# Patient Record
Sex: FEMALE | Race: WHITE | Employment: FULL TIME | URBAN - METROPOLITAN AREA
[De-identification: names, ages, dates, MRNs, and addresses within clinical notes are randomized per-mention and may not be internally consistent; named-entity substitution may affect disease eponyms.]

---

## 2017-01-06 ENCOUNTER — TELEPHONE (OUTPATIENT)
Dept: FAMILY MEDICINE CLINIC | Facility: CLINIC | Age: 22
End: 2017-01-06

## 2017-01-06 NOTE — TELEPHONE ENCOUNTER
Pt informed of Dr Quinn Almanza' response below but re-iterated ER if s/sx worsen or develops SI/HI. Pt voiced understanding and agrees. Denies further questions/concerns at this time.

## 2017-01-06 NOTE — TELEPHONE ENCOUNTER
Dr Gi Alonzo,    Pt Escitalopram dose was increased from 10 mg increased to 20 mg which has helped to some degree, but she is waking up at night in cold sweats with chest pressure, palpitations some sob. dose changed.  She does have stress from work, family an

## 2017-01-10 ENCOUNTER — TELEPHONE (OUTPATIENT)
Dept: FAMILY MEDICINE CLINIC | Facility: CLINIC | Age: 22
End: 2017-01-10

## 2017-01-10 NOTE — PROGRESS NOTES
Patient ID: Tammie Chambers is a 22year old female. HPI  Patient presents with: Anxiety: pt fees anxiety is getting worst   Depression  Chest Pain    She is on Lexapro 20 mg. She still lives in a very crowded house.   One good news though is her fathe and vitals reviewed. Blood pressure 106/71, pulse 85, temperature 97.6 °F (36.4 °C), temperature source Oral, height 5' 6\" (1.676 m), weight 161 lb (73.029 kg), not currently breastfeeding.          ASSESSMENT/PLAN:     Diagnoses and all orders for this

## 2017-01-10 NOTE — TELEPHONE ENCOUNTER
Narda from [facility] would like to get pt on Short term medication option, which is where pt would see Dr Lolita Del Cid for 4-6 sessions (1x monthly) at [facility] and at the end will report back to Dr Savage for continued Medicine management, she

## 2017-01-20 NOTE — TELEPHONE ENCOUNTER
Tried calling again. I was informed by Tonia Pearson that Virgen Sanchez is out of the office and she will be returning on Monday.

## 2017-01-25 ENCOUNTER — LAB ENCOUNTER (OUTPATIENT)
Dept: LAB | Age: 22
End: 2017-01-25
Attending: Other
Payer: COMMERCIAL

## 2017-01-25 DIAGNOSIS — Z00.00 ROUTINE MEDICAL EXAM: ICD-10-CM

## 2017-01-25 LAB
ALBUMIN SERPL BCP-MCNC: 3.7 G/DL (ref 3.5–4.8)
ALBUMIN/GLOB SERPL: 1.2 {RATIO} (ref 1–2)
ALP SERPL-CCNC: 43 U/L (ref 39–325)
ALT SERPL-CCNC: 21 U/L (ref 14–54)
ANION GAP SERPL CALC-SCNC: 7 MMOL/L (ref 0–18)
AST SERPL-CCNC: 19 U/L (ref 15–41)
BILIRUB SERPL-MCNC: 0.8 MG/DL (ref 0.3–1.2)
BUN SERPL-MCNC: 10 MG/DL (ref 8–20)
BUN/CREAT SERPL: 11.4 (ref 10–20)
CALCIUM SERPL-MCNC: 9 MG/DL (ref 8.5–10.5)
CHLORIDE SERPL-SCNC: 103 MMOL/L (ref 95–110)
CO2 SERPL-SCNC: 29 MMOL/L (ref 22–32)
CREAT SERPL-MCNC: 0.88 MG/DL (ref 0.5–1.5)
ERYTHROCYTE [DISTWIDTH] IN BLOOD BY AUTOMATED COUNT: 12.9 % (ref 11–15)
FOLATE SERPL-MCNC: 10.5 NG/ML
GLOBULIN PLAS-MCNC: 3.1 G/DL (ref 2.5–3.7)
GLUCOSE SERPL-MCNC: 94 MG/DL (ref 70–99)
HCG SERPL QL: NEGATIVE
HCT VFR BLD AUTO: 39.2 % (ref 35–48)
HGB BLD-MCNC: 13.1 G/DL (ref 12–16)
MCH RBC QN AUTO: 27.6 PG (ref 27–32)
MCHC RBC AUTO-ENTMCNC: 33.3 G/DL (ref 32–37)
MCV RBC AUTO: 82.8 FL (ref 80–100)
OSMOLALITY UR CALC.SUM OF ELEC: 287 MOSM/KG (ref 275–295)
PLATELET # BLD AUTO: 226 K/UL (ref 140–400)
PMV BLD AUTO: 8.7 FL (ref 7.4–10.3)
POTASSIUM SERPL-SCNC: 3.6 MMOL/L (ref 3.3–5.1)
PROT SERPL-MCNC: 6.8 G/DL (ref 5.9–8.4)
RBC # BLD AUTO: 4.74 M/UL (ref 3.7–5.4)
SODIUM SERPL-SCNC: 139 MMOL/L (ref 136–144)
TSH SERPL-ACNC: 0.62 UIU/ML (ref 0.34–5.6)
VIT B12 SERPL-MCNC: 410 PG/ML (ref 181–914)
WBC # BLD AUTO: 8.1 K/UL (ref 4–11)

## 2017-01-25 PROCEDURE — 36415 COLL VENOUS BLD VENIPUNCTURE: CPT

## 2017-01-25 PROCEDURE — 84443 ASSAY THYROID STIM HORMONE: CPT

## 2017-01-25 PROCEDURE — 82746 ASSAY OF FOLIC ACID SERUM: CPT

## 2017-01-25 PROCEDURE — 82306 VITAMIN D 25 HYDROXY: CPT

## 2017-01-25 PROCEDURE — 84703 CHORIONIC GONADOTROPIN ASSAY: CPT

## 2017-01-25 PROCEDURE — 80053 COMPREHEN METABOLIC PANEL: CPT

## 2017-01-25 PROCEDURE — 82607 VITAMIN B-12: CPT

## 2017-01-25 PROCEDURE — 85027 COMPLETE CBC AUTOMATED: CPT

## 2017-01-27 LAB — 25(OH)D3 SERPL-MCNC: 21.2 NG/ML

## 2017-01-27 RX ORDER — NORGESTIMATE-ETHINYL ESTRADIOL 7DAYSX3 28
TABLET ORAL
Qty: 84 TABLET | Refills: 0 | Status: SHIPPED | OUTPATIENT
Start: 2017-01-27 | End: 2017-04-23

## 2017-02-24 NOTE — ED NOTES
Pt was tachypnic, pt was able to speak in full sentences until now but now pt is breathing normally, verbalized feeling a little better.

## 2017-02-24 NOTE — ED INITIAL ASSESSMENT (HPI)
Father states that pt got yelled at by her boss and afterwards she had a panic attack.   Pt has been here for panic attacks in the past.

## 2017-02-24 NOTE — ED NOTES
Pt verbalized that she got panic attack while at work. Pt has a hx of that. Pt was seen in er about 5x times within 5 year frame. pt verbalized that she started to shake, couldn't breath and became dizzy-she couldn't stand.  Pt was carried by dad and the ma

## 2017-02-24 NOTE — ED PROVIDER NOTES
Patient Seen in: [facility] AND [facility] Emergency Department    History   Patient presents with:   Anxiety/Panic attack (neurologic)    Stated Complaint:     HPI    22year old female with history of anxiety and panic attacks who presents to the ER saying that s (Room air)       Current:/95 mmHg  Pulse 72  Temp(Src) 98.7 °F (37.1 °C) (Oral)  Resp 19  Ht 167.6 cm (5' 6\")  Wt 70.308 kg  BMI 25.03 kg/m2  SpO2 99%  LMP 02/23/2017        Physical Exam   Constitutional: She is oriented to person, place, and time. ---------                               -----------         ------                     CBC W/ DIFFERENTIAL[[id number]]                              Final result                 Please view results for these tests on the individual orders. interpretation of the above emergency department workup, the patient was found to have Panic attack  (primary encounter diagnosis)    Plan: Supportive care. Further Outpatient evaluation and treatment will be required.  I personally discussed the results o

## 2017-02-27 NOTE — TELEPHONE ENCOUNTER
Reason for Call/Chief Complaint: panic attack, chest pain  Onset: intermittent  Nursing Assessment/Associated Symptoms: Pt states she was in ER for a panic attack on 2/23/17.  She is on zoloft and clonazepam. She does have a psychiatrist but she does not pa

## 2017-02-27 NOTE — TELEPHONE ENCOUNTER
Pt calling regarding going to the hospital for really bad panic attack for two hours on Friday and now she is experiencing chest pain on an off. ... please advise

## 2017-02-27 NOTE — TELEPHONE ENCOUNTER
I went ahead and did a referral through [facility] for her mental health to get her a counselor for

## 2017-02-28 ENCOUNTER — TELEPHONE (OUTPATIENT)
Dept: FAMILY MEDICINE CLINIC | Facility: CLINIC | Age: 22
End: 2017-02-28

## 2017-02-28 NOTE — PROGRESS NOTES
Patient ID: Joss Saldana is a 22year old female.     HPI  Patient presents with:  ER F/U: shortness of breath  and anxiety       ED Provider Notes by Deepika Stout MD at 2/23/2017  8:34 PM      Author: Deepika Stout MD Service: (none) Author Typ she feels confused because of her anxiety. We are trying to get her a counselor as well to talk to.           Review of Systems         Current Outpatient Prescriptions:  ClonazePAM (KLONOPIN) 0.5 MG Oral Tab Take 1 tablet (0.5 mg total) by mouth 2 (two) t does not realize that her most likely taking deep breaths and having heart racing is due to anxiety and panic attacks.   I told her most times this is just a chemical imbalance in the brain and she would benefit from counseling as well as seeing a psychiatr

## 2017-02-28 NOTE — TELEPHONE ENCOUNTER
Per pt she was in ER last 02/23/2017 for panic attack and ER  did not give any med to pt but pt is taking Zoloft 100mg and clonopin but lately pt is gasping for air and it hurts her chest for a couple of mins .  Pt stts that she have not see VS for F/U af

## 2017-02-28 NOTE — TELEPHONE ENCOUNTER
Reason for Call/Chief Complaint:  Every 5 minutes gasp for air, feels dizzy & weak since Thursday severe panic attack. Chest gets tight. Onset: Hx of anxiety & panic attack. Last panic attack on 2/23/17-ED visit.   Nursing Assessment/Associated Sympt

## 2017-03-04 ENCOUNTER — TELEPHONE (OUTPATIENT)
Dept: FAMILY MEDICINE CLINIC | Facility: CLINIC | Age: 22
End: 2017-03-04

## 2017-03-04 NOTE — TELEPHONE ENCOUNTER
Patient calling requesting to speak with DR. Gardner's nurse   Patient feeling anxiety, she is on medication and just took it 2 hours ago but still feels anxiety and wants to ask nurses if she can take another pill  transferring to nurse now

## 2017-03-04 NOTE — TELEPHONE ENCOUNTER
Please reassess pt.  Can take 2nd one but also concerned pt may need adjustments on her treatment and should follow up with Dr. Juan M Brown this week

## 2017-03-04 NOTE — TELEPHONE ENCOUNTER
Reason for Call/Chief Complaint: Anxious and took lorazepam 0.5 mg 2 hours ago and wondering if can take 2nd dose now as normally waits 12 hrs in between doses  Onset: today  Nursing Assessment/Associated Symptoms: States \"something happened\" (did not wa

## 2017-03-04 NOTE — TELEPHONE ENCOUNTER
Spoke with pt who reports did take the second dose and feels better. Pt in pleasant mood on the phone.  Informed of Dr Quin Kong recommendation that she follow up with VS and pt agrees; scheduled for Monday 3/6/17 at 11:20am. Patient denied further questions/co

## 2017-03-06 NOTE — PROGRESS NOTES
Patient ID: Rashmi Vaughn is a 22year old female. HPI  Patient presents with: Anxiety    She states 2 days ago she was feeling some chest pain and she thought it was her anxiety so she did take an Ativan and it helped almost immediately.   A few hour heard.  Pulmonary/Chest: Effort normal and breath sounds normal. No respiratory distress. Neurological: Patient is alert and oriented to person, place, and time. Skin: Skin is warm and dry. Psychiatric: Patient is anxious.   Nursing note and vitals re

## 2017-03-08 ENCOUNTER — TELEPHONE (OUTPATIENT)
Dept: FAMILY MEDICINE CLINIC | Facility: CLINIC | Age: 22
End: 2017-03-08

## 2017-03-08 ENCOUNTER — APPOINTMENT (OUTPATIENT)
Dept: GENERAL RADIOLOGY | Facility: HOSPITAL | Age: 22
End: 2017-03-08
Payer: COMMERCIAL

## 2017-03-08 PROCEDURE — 71020 XR CHEST PA + LAT CHEST (CPT=71020): CPT

## 2017-03-08 NOTE — TELEPHONE ENCOUNTER
Pt is calling is calling state that she is having heart palpitation pt is requesting to speak with a RN

## 2017-03-08 NOTE — ED PROVIDER NOTES
Patient Seen in: [facility] AND [facility] Emergency Department    History   Patient presents with:   Anxiety/Panic attack (neurologic)  Chest Pain Angina (cardiovascular)    Stated Complaint:     HPI    Patient a 21-year-old female presents to the ER with past m Negative. Respiratory: Positive for chest tightness. Negative for cough and shortness of breath. Cardiovascular: Positive for chest pain and palpitations. Negative for leg swelling. Gastrointestinal: Negative.   Negative for nausea, vomiting, abdomi Skin: Skin is warm and dry. Psychiatric: Her speech is normal and behavior is normal. Judgment and thought content normal. Her mood appears anxious. Cognition and memory are normal.   Anxious, but cooperative.  Denies SI/HI     Differential diagnosis in Unremarkable pulmonary vasculature. MEDIAST/MADY: No visible mass or adenopathy. LUNGS/PLEURA: Normal.  No significant pulmonary parenchymal abnormalities. No effusion or pleural thickening. BONES: No fracture or visible bony lesion. OTHER: Negative.

## 2017-03-08 NOTE — ED NOTES
Care assumed. Assessment completed. Alert and interactive with 2 week hx of intermittent CP lasting 30 sec occurring both with activity and at rest. Denies \"racing heart\"/SOB/N/diaphoresis.

## 2017-03-08 NOTE — TELEPHONE ENCOUNTER
Reason for Call/Chief Complaint: Patient states has been having increasing crushing pain in chest for the last week   Onset: one week   Nursing Assessment/Associated Symptoms: Patient states that for the last two weeks she has been having a crushing stabbing pain in the middle of her chest. She states that this has become more frequent and last night was awoken with the pain. She states that she does get SOB when that occurs. States today she had to pull over while driving as she experienced the crushing squeezing pain in her chest, states did feel SOB when that occurred. Patient does have anxiety but only takes Lorazepam as needed. ____________________________________________________  Medication List reviewed: yes - patient states has been on [facility] since 13years of age. Has been on Zoloft for a few months as well. Allergies verified: yes  _____________________________________________________  Nursing Interventions/Advice: Advised evaluation at ER due to chest pain / SOB   Able to verbalize and acknowledge recommended instructions in own words? yes  Sent to Physician for verbal/written orders and treatment as necessary: no  _____________________________________________________  Recommended disposition: Sent to [facility] ER - declined 911 to be called by patient, will have someone bring her to ER  Patient was offered an appointment: no  If yes, when is the appointment: Will follow up 3/9/17  Patient provided with clinic contact information, hours of operation and will call back if needed. Patient verbalizes understanding and agrees with plan.

## 2017-03-09 NOTE — TELEPHONE ENCOUNTER
LMTCB. Please transfer to [id number] anytime. Pt seen at [address] [place] [address] ER for anxiety related chest pain.

## 2017-03-09 NOTE — ED NOTES
Pt left department after review of diagnostics with Fredis Castle without receiving discharge instructions or final vital signs.

## 2017-03-09 NOTE — TELEPHONE ENCOUNTER
Pt verifies seen in [address] [place] [address] ER and diagnosed with anxiety related chest pain after normal cadiac work up. States did not feel like was having increased anxiety then or now but still having slight mid-chest discomfort. Advised f/u with Dr Charo Richards and pt agrees. Scheduled for tomorrow at 1:20pm. Advised to call back if s/sx increase or develops new sympotoms; ER if severe chest pain or if develops SOB. Pt voiced understanding and agrees.

## 2017-03-10 ENCOUNTER — OFFICE VISIT (OUTPATIENT)
Dept: FAMILY MEDICINE CLINIC | Facility: CLINIC | Age: 22
End: 2017-03-10

## 2017-03-10 VITALS
HEIGHT: 66 IN | BODY MASS INDEX: 26.2 KG/M2 | SYSTOLIC BLOOD PRESSURE: 141 MMHG | WEIGHT: 163 LBS | HEART RATE: 81 BPM | TEMPERATURE: 98 F | DIASTOLIC BLOOD PRESSURE: 68 MMHG

## 2017-03-10 DIAGNOSIS — F41.0 PANIC ATTACKS: ICD-10-CM

## 2017-03-10 DIAGNOSIS — R07.89 ATYPICAL CHEST PAIN: Primary | ICD-10-CM

## 2017-03-10 PROCEDURE — 99212 OFFICE O/P EST SF 10 MIN: CPT | Performed by: FAMILY MEDICINE

## 2017-03-10 PROCEDURE — 99214 OFFICE O/P EST MOD 30 MIN: CPT | Performed by: FAMILY MEDICINE

## 2017-03-10 NOTE — PROGRESS NOTES
Patient ID: Amee Nolen is a 22year old female.       ED Provider Notes by ADAL Scott at 3/8/2017  5:40 PM      Author: ADAL Scott Service: (none) Author Type: ADAL     Filed: 3/8/2017 11:10 PM Note Time: 3/8/2017  5:40 PM Status: Attested when she is near animals she feels much more calm and has no difficulties or anxiety or chest pain or heart pain whatsoever but once she leaves there is no she is to get back to her regular life this is when the pain comes back and the anxiety comes back. she really starts having chest pain or panic attack before taking the medicine. Try to do biofeedback and breathing techniques.   Go to the far more often and of course see the psychiatrist.  Panic attacks  Hospital notes reviewed      Follow up if symptom

## 2017-03-16 NOTE — TELEPHONE ENCOUNTER
Pt stts yesterday she only took a pill and a half for her anxiety   Pt stts yesterday she was feeling some kind of anxiety   Pt stts when she takes 2 pills she doesn't feel anxiety   Pt is asking to have a call back to further discuss

## 2017-03-16 NOTE — TELEPHONE ENCOUNTER
Actions Requested: Asking if it is okay to take 2 of her Zoloft pills instead of the 1.5, she states she took 2 the other day and did not have anxiety the whole day, states when taking the 1.5 she has anxiety even with the Lorazepam in between  Situation/B

## 2017-03-17 NOTE — TELEPHONE ENCOUNTER
Spoke to pt, informed her of medication change. Instructed her to take only 200mg as that is the highest dose of Zoloft that she can take. Advised her to call back if she has any questions or concerns. She voices understanding and agrees with plan.

## 2017-03-17 NOTE — TELEPHONE ENCOUNTER
I went ahead and sent a new prescription to the pharmacy stating that she can be on a total of 200 mg of Zoloft daily. This is the maximum dose so she should not go over this.

## 2017-04-03 ENCOUNTER — TELEPHONE (OUTPATIENT)
Dept: FAMILY MEDICINE CLINIC | Facility: CLINIC | Age: 22
End: 2017-04-03

## 2017-04-03 NOTE — TELEPHONE ENCOUNTER
She fell off her horse two days ago and her neck hurts, she didn't go to the ER or IC, she wants to speak with a nurse due to her anxiety making everything worse in her mind, she's not sure about medication.  Please advise

## 2017-04-03 NOTE — TELEPHONE ENCOUNTER
Actions Requested: States two days ago she fell off her horse, she states she doesn't recall hitting her head, states yesterday and today her neck is very stiff and needs to use her hands just to move her neck. She states also her left leg hurts.  Advised t

## 2017-04-04 ENCOUNTER — HOSPITAL ENCOUNTER (EMERGENCY)
Facility: HOSPITAL | Age: 22
Discharge: HOME | End: 2017-04-04
Attending: EMERGENCY MEDICINE
Payer: COMMERCIAL

## 2017-04-04 VITALS
TEMPERATURE: 98 F | HEIGHT: 66 IN | RESPIRATION RATE: 18 BRPM | DIASTOLIC BLOOD PRESSURE: 75 MMHG | OXYGEN SATURATION: 98 % | HEART RATE: 76 BPM | WEIGHT: 160 LBS | BODY MASS INDEX: 25.71 KG/M2 | SYSTOLIC BLOOD PRESSURE: 134 MMHG

## 2017-04-04 DIAGNOSIS — S16.1XXA NECK STRAIN, INITIAL ENCOUNTER: Primary | ICD-10-CM

## 2017-04-04 PROCEDURE — 99283 EMERGENCY DEPT VISIT LOW MDM: CPT

## 2017-04-04 RX ORDER — IBUPROFEN 600 MG/1
600 TABLET ORAL ONCE
Status: COMPLETED | OUTPATIENT
Start: 2017-04-04 | End: 2017-04-04

## 2017-04-04 RX ORDER — IBUPROFEN 600 MG/1
600 TABLET ORAL EVERY 8 HOURS PRN
Qty: 30 TABLET | Refills: 0 | Status: SHIPPED | OUTPATIENT
Start: 2017-04-04 | End: 2017-05-03 | Stop reason: ALTCHOICE

## 2017-04-04 NOTE — TELEPHONE ENCOUNTER
Patient contacted, advised that she needs to be transported to ER, 911 contacted and stayed on the line until EMS arrived, call discontinued

## 2017-04-04 NOTE — ED INITIAL ASSESSMENT (HPI)
Pt had a fall from a horse two days ago. Pt reports unresolved neck and mid back. Pt denies  LOC.  No issues with b/b

## 2017-04-05 NOTE — TELEPHONE ENCOUNTER
Pt seen in [address] [place] [address] ER 4/4/17 and states did not have diagnostict testing done--states doctor just felt neck and was diagnosed with neck strain (dx noted in [address] [place] [address] documentation); pt states no neck brace provided.  Noted pt prescribed ibuprofen but pt reports only

## 2017-04-05 NOTE — ED PROVIDER NOTES
Patient Seen in: [facility] Emergency Department    History   Patient presents with:  Head Neck Injury (neurologic, musculoskeletal)      HPI    The patient presents complaining of anterior neck pain after falling off of a horse 2 days ago.   She s Skin: Negative for rash and wound. Neurological: Negative for syncope and headaches. Constitutional and vital signs reviewed. All other systems reviewed and negative except as noted above.     PSFH elements reviewed from today and agreed exce follow-up.       Disposition and Plan     Clinical Impression:  Neck strain, initial encounter  (primary encounter diagnosis)    Disposition:  Discharge    Follow-up:  Isadora Garcia DO  [address]  [phone]    Sched

## 2017-04-06 NOTE — TELEPHONE ENCOUNTER
Offered pt appt but declined at this time. States will call back for f/u with [facility] once figures out a day/time she can come in.    Dr Pineda Tickfaw, please note or advise further

## 2017-04-11 ENCOUNTER — TELEPHONE (OUTPATIENT)
Dept: FAMILY MEDICINE CLINIC | Facility: CLINIC | Age: 22
End: 2017-04-11

## 2017-04-11 NOTE — TELEPHONE ENCOUNTER
Per pt, she saw in her Mychart that she is due to christian, Hep A; HPV; Meningitis and influenza. Pt would like to know is she needs this vaccines?   If so needs to know when can she come in?

## 2017-04-21 ENCOUNTER — TELEPHONE (OUTPATIENT)
Dept: FAMILY MEDICINE CLINIC | Facility: CLINIC | Age: 22
End: 2017-04-21

## 2017-04-21 NOTE — TELEPHONE ENCOUNTER
Actions Requested: Pt requesting new referral for different psychiatrist.   Situation/Background   Problem: Anxiety   Onset: q5cydual   Associated Symptoms: States wanting to have a different psychiatrist. States has been given medications and is being t

## 2017-04-21 NOTE — TELEPHONE ENCOUNTER
Pt returned call. Pt informed of Dr. Gorman Scale message as shown below. Pt aware [facility] will get a hold of her. Pt verbalized understanding of whole message and had no further questions or requests at this time.

## 2017-04-21 NOTE — TELEPHONE ENCOUNTER
Patient called to request for a new referral for a different psychiatrist. She is seeing Dr. Kirsty Friedman in [place] and states that she is not much help - is only giving her medication and is basically telling her to figure out things on her own, states sh

## 2017-04-21 NOTE — TELEPHONE ENCOUNTER
Let her know a new referral to [facility] has been done. They will get a hold of her. I sent her message to them as well so they can see what the issue was.

## 2017-04-24 RX ORDER — NORGESTIMATE-ETHINYL ESTRADIOL 7DAYSX3 28
TABLET ORAL
Qty: 1 PACKAGE | Refills: 0 | Status: SHIPPED | OUTPATIENT
Start: 2017-04-24 | End: 2017-05-24

## 2017-04-24 NOTE — TELEPHONE ENCOUNTER
Gynecology Medications; PLEASE ADVISE ON REFILL. THANKS.   Protocol Criteria:  · Appointment scheduled in the past 12 months or the next 3 months  · Pap smear in the past 12 months  · Pap smear WNL manually verified  Recent Visits       Provider Department

## 2017-04-26 NOTE — TELEPHONE ENCOUNTER
I placed a referral for [facility]. Please let pt know someone will be contacting her for this and please call them in morning so they call her promptly to decrease repeat ER visits.

## 2017-04-26 NOTE — TELEPHONE ENCOUNTER
Actions Requested: Pt called, states that her anxiety has increased.   Situation/Background   Problem: Anxiety   Onset: last couple of days   Associated Symptoms: Dizziness, shaking, difficulty remembering things   History of Same:    Precipitated By: Cj Coulter' > 10 minutes and not relieved by reassurance provided by triager    Negative Triage Questions:   * Severe difficulty breathing (e.g., struggling for each breath, speaks in single words)  * Bluish lips, tongue, or face now  * Difficult to awaken or acting co

## 2017-04-26 NOTE — TELEPHONE ENCOUNTER
Patient gets the shakes and her head feels heavy, nosebleed, light headed/ and anxiety attacks  Please advise

## 2017-04-26 NOTE — TELEPHONE ENCOUNTER
Pt informed of Dr Walker Stafford orders below which she verbalized understanding. Pts' father will be taking her to ED today. Clinical staff to call [facility] 4/27/17 and make sure someone will call her promptly.

## 2017-04-26 NOTE — TELEPHONE ENCOUNTER
Called pt to see if symptoms have resolved, pt states that she still feels the same, took her medication again with no relief but pt is reluctant to go to the ED.    When asking about stress reduction, I asked if it helped for her to take a walk with her rubi

## 2017-04-27 NOTE — TELEPHONE ENCOUNTER
Received a tab from [employer] to notify us that this patient has been contacted by them and evaluation for anxiety / depressive disorder  has been initiated. They will make Dr. August Nina aware as well.

## 2017-04-27 NOTE — ED PROVIDER NOTES
Patient Seen in: [facility] AND [facility] Emergency Department    History   Patient presents with:   Anxiety/Panic attack (neurologic)    Stated Complaint: anxiety    HPI    32-year-old female with history of anxiety and panic attacks presents with complaints of complaint: anxiety  Other systems are as noted in HPI. Constitutional and vital signs reviewed. All other systems reviewed and negative except as noted above. PSFH elements reviewed from today and agreed except as otherwise stated in HPI.     Physi All other components within normal limits   CBC WITH DIFFERENTIAL WITH PLATELET    Narrative: The following orders were created for panel order CBC WITH DIFFERENTIAL WITH PLATELET.   Procedure                               Abnormality         Status

## 2017-04-28 ENCOUNTER — TELEPHONE (OUTPATIENT)
Dept: FAMILY MEDICINE CLINIC | Facility: CLINIC | Age: 22
End: 2017-04-28

## 2017-05-01 NOTE — TELEPHONE ENCOUNTER
Spoke with patient (name and  verified),patient would like to set up f/u visit for ER visit, panic attack 4, she was informed that Dr Jacquie Friedman was out of the office until 17, she requested to see another physician, appt made 5/3/17 with Dr Naveen Salgado

## 2017-05-03 NOTE — PROGRESS NOTES
5/3/2017  11:13 AM    Hussein Chi is a 22year old female. Chief complaint(s): Patient presents with:  ER F/U: Patient here to f/u after being in the ER for her anxiety. Patient feels like her depression/anxiety is getting worse.  She is becoming very 04/27/2012      TDAP                  04/27/2012      Varicella             08/19/1998 11/23/2012      Medications (Active prior to today's visit):    Current Outpatient Prescriptions:  TRINESSA, 28, 0.18/0.215/0.25 MG-35 MCG Oral Tab TAKE 1 TABLET normal and breath sounds normal. She has no wheezes. She has no rales. Lymphadenopathy:     She has no cervical adenopathy. Skin: No rash noted. Psychiatric: She has a normal mood and affect.  Her behavior is normal.       LABORATORY RESULTS:   No res 2.8 1.0-4.0 K/UL   Monocyte Absolute 0.8 0.0-1.0 K/UL   Eosinophil Absolute 0.5 0.0-0.7 K/UL   Basophil Absolute 0.1 0.0-0.2 K/UL     EKG / Spirometry : -     Radiology: No results found. -    ASSESSMENT/PLAN:   Assessment  Recurrent major depressive disor

## 2017-05-16 ENCOUNTER — TELEPHONE (OUTPATIENT)
Dept: FAMILY MEDICINE CLINIC | Facility: CLINIC | Age: 22
End: 2017-05-16

## 2017-05-16 RX ORDER — SERTRALINE HYDROCHLORIDE 100 MG/1
200 TABLET, FILM COATED ORAL DAILY
Qty: 60 TABLET | Refills: 2 | Status: SHIPPED | OUTPATIENT
Start: 2017-05-16 | End: 2017-05-17

## 2017-05-16 NOTE — TELEPHONE ENCOUNTER
LOV for anxiety/depression- 5/3/17 with Dr. Eric Parrish  LR-3/2017 3 month supply. Does not meet RN protocol, pls advise on refill request. Thank you.

## 2017-05-16 NOTE — TELEPHONE ENCOUNTER
From: Ibis Bronson  To:  Ailin Domínguez DO  Sent: 5/4/2017 9:49 PM CDT  Subject: Medication Renewal Request    Original authorizing provider: DO Ibis Nguyen would like a refill of the following medications:  Sertraline HCl 100 MG Ora

## 2017-05-16 NOTE — TELEPHONE ENCOUNTER
Dr. Richard Kahn and Dr. Nohemi Adkins,               I have left several messages for your patient with my contact information in an effort to help with behavioral health navigation but I have not received a response.  She was not happy with her treating psychiatrist,

## 2017-05-24 ENCOUNTER — TELEPHONE (OUTPATIENT)
Dept: FAMILY MEDICINE CLINIC | Facility: CLINIC | Age: 22
End: 2017-05-24

## 2017-05-24 NOTE — TELEPHONE ENCOUNTER
Pt was given medication from her physiatrist . Pt now stating that she keep getting shortness of breath. Seems like her heart is beating fast. Pt feeling very uncomfortable.

## 2017-05-24 NOTE — TELEPHONE ENCOUNTER
Actions Requested: Pt reports SOB, \"feel like I ran around the block, but I am just standing here. \"   Started earlier today, has some dizziness and feels heart racing.   Pt has had panic attacks in the past and started abilify, gabapentin, Fluoxetine on 5 and hasn't returned  * Choking on something  * Severe difficulty breathing (e.g., struggling for each breath, speaks in single words, pulse > 120)  * Bluish lips, tongue, or face now  * Difficult to awaken or acting confused (e.g., disoriented, slurred spe

## 2017-05-25 NOTE — TELEPHONE ENCOUNTER
LMTCB- transfer patient to triage [id number]. ER f/u.  Do not see that patient went to [address] [place] [address] ER.

## 2017-05-26 NOTE — PATIENT INSTRUCTIONS
Go ahead and start taking 3 of the fluoxetine 10 mg at the same time to equal 30 mg per day. Continue taking her gabapentin 300 mg 3 times daily and then the Abilify 5 mg once daily.   Make sure to see her psychiatrist.

## 2017-05-26 NOTE — TELEPHONE ENCOUNTER
Patient did not go to the ED. She stated she feels fine today. Patient stated her Dad feels she is getting asthma. She stated she continues to have a racing heart but always does due to her anxiety.   Appointment made for today at 2:10 pm for evaluation

## 2017-05-26 NOTE — PROGRESS NOTES
Patient ID: Aicha Tyson is a 22year old female.       Telephone Encounter by Chey Espinoza RN at 5/24/2017  4:52 PM      Author: Chey Espinoza RN Service: (none) Author Type: Registered Nurse     Filed: 5/24/2017  5:20 PM Note Time: 5/24/2017   able to restate instructions in their own words, verbalizes understanding and agrees with plan.     Protocol Used: Breathing Difficulty (Adult)  Protocol-Based Disposition: Go to ED Now          HPI  Patient presents with:  Shortness Of Breath: about a week TRINESSA, 28, 0.18/0.215/0.25 MG-35 MCG Oral Tab TAKE 1 TABLET BY MOUTH EVERY DAY Disp: 1 Package Rfl: 0     Allergies:  Iodine (Topical)        Rash  Radiology Contrast *       PHYSICAL EXAM:   Physical Exam  Blood pressure 134/96, pulse 83, temperature Take medicine (if given) as prescribed. Approach to treatment discussed and patient/family member understands and agrees to plan.        Dc Camacho,   5/26/2017

## 2017-05-30 RX ORDER — NORGESTIMATE-ETHINYL ESTRADIOL 7DAYSX3 28
TABLET ORAL
Qty: 28 TABLET | Refills: 2 | Status: SHIPPED | OUTPATIENT
Start: 2017-05-30 | End: 2017-07-08

## 2017-05-30 NOTE — TELEPHONE ENCOUNTER
Refill Protocol Appointment Criteria  · Appointment scheduled in the past 12 months or in the next 3 months  Recent Visits       Provider Department Primary Dx    3 weeks ago Krzysztof Oliveros MD [facility] [place] Recurrent major depre

## 2017-06-03 ENCOUNTER — TELEPHONE (OUTPATIENT)
Dept: FAMILY MEDICINE CLINIC | Facility: CLINIC | Age: 22
End: 2017-06-03

## 2017-06-03 NOTE — TELEPHONE ENCOUNTER
If the pain gets very bad she will have to go to the immediate care or emergency room for evaluation.

## 2017-06-03 NOTE — TELEPHONE ENCOUNTER
Spoke with pt informed per Dr MEREDITH to go to immediate care/ER if pain persists or worsens.  Pt states pain is still present she will go to [facility] immediate care today,

## 2017-06-03 NOTE — TELEPHONE ENCOUNTER
Pt states she has been taking Birthcontrol pills since she was 16. Pt states she is now having very bad left side pain, states it's a sharp pain. Pt states it hurst when moving around or eating. States she cant finish eating due to no appetite.    Pt stat

## 2017-06-03 NOTE — TELEPHONE ENCOUNTER
Actions Requested: Pt requesting advise from nurse. Situation/Background   Problem: Intermittent left side abdominal pain near ovary area that is sharp, duration  one week, occurs about 20 times per day with PS 8. Sometimes feels bloated and gassy.  Normal minutes and over 48years old  * Pain lasting > 10 minutes and over 36years old and associated chest, arm, neck, upper back, or jaw pain  * Pain lasting > 10 minutes and over 28years old and at least one cardiac risk factor  * Pain lasting > 10 minutes a

## 2017-06-13 ENCOUNTER — HOSPITAL ENCOUNTER (EMERGENCY)
Facility: HOSPITAL | Age: 22
Discharge: HOME | End: 2017-06-13
Attending: EMERGENCY MEDICINE
Payer: COMMERCIAL

## 2017-06-13 VITALS
SYSTOLIC BLOOD PRESSURE: 134 MMHG | HEIGHT: 66 IN | HEART RATE: 69 BPM | TEMPERATURE: 98 F | OXYGEN SATURATION: 97 % | RESPIRATION RATE: 18 BRPM | DIASTOLIC BLOOD PRESSURE: 78 MMHG | BODY MASS INDEX: 27.32 KG/M2 | WEIGHT: 170 LBS

## 2017-06-13 DIAGNOSIS — R07.89 CHEST TIGHTNESS: Primary | ICD-10-CM

## 2017-06-13 PROCEDURE — 85610 PROTHROMBIN TIME: CPT | Performed by: EMERGENCY MEDICINE

## 2017-06-13 PROCEDURE — 93005 ELECTROCARDIOGRAM TRACING: CPT

## 2017-06-13 PROCEDURE — 36415 COLL VENOUS BLD VENIPUNCTURE: CPT

## 2017-06-13 PROCEDURE — 85379 FIBRIN DEGRADATION QUANT: CPT | Performed by: EMERGENCY MEDICINE

## 2017-06-13 PROCEDURE — 85730 THROMBOPLASTIN TIME PARTIAL: CPT | Performed by: EMERGENCY MEDICINE

## 2017-06-13 PROCEDURE — 99283 EMERGENCY DEPT VISIT LOW MDM: CPT

## 2017-06-13 PROCEDURE — 80048 BASIC METABOLIC PNL TOTAL CA: CPT | Performed by: EMERGENCY MEDICINE

## 2017-06-13 PROCEDURE — 93010 ELECTROCARDIOGRAM REPORT: CPT | Performed by: EMERGENCY MEDICINE

## 2017-06-14 NOTE — ED NOTES
Patient is resting comfortably, watching TV. She is calm, cooperative and conversant, interacting normally with family.

## 2017-06-14 NOTE — ED INITIAL ASSESSMENT (HPI)
Pt states hx panic attack and states \"my dad doesn't understand that when I say my heart hurts that it is a panic attack so he made me come. \" Denies cp at this time but states left-sided cp while driving here. Pt in no distress at this time.

## 2017-06-14 NOTE — ED PROVIDER NOTES
Patient Seen in: [facility] AND [facility] Emergency Department    History   Patient presents with: Anxiety/Panic attack (neurologic)    Stated Complaint: anxiety; blood in stool    HPI    25year old female complains of \"anxiety\".   Patient reports that on a ch today and agreed except as otherwise stated in HPI.     Physical Exam       ED Triage Vitals   BP 06/13/17 2116 152/88 mmHg   Pulse 06/13/17 2116 73   Resp 06/13/17 2116 18   Temp 06/13/17 2116 98.2 °F (36.8 °C)   Temp src 06/13/17 2116 Oral   SpO2 06/13/17 (8) - Abnormal; Notable for the following:     Glucose 124 (*)     All other components within normal limits   D-DIMER - Normal   PTT, ACTIVATED - Normal   PROTHROMBIN TIME (PT) - Normal      EKG    Rate, intervals and axes as noted on EKG Report.   Rate: 7 or Sign-Outs: None    Impression:  After review and interpretation of the above emergency department workup, the patient was found to have Chest tightness  (primary encounter diagnosis)    Plan: Supportive care.   Further Outpatient evaluation and treatment

## 2017-06-27 ENCOUNTER — TELEPHONE (OUTPATIENT)
Dept: FAMILY MEDICINE CLINIC | Facility: CLINIC | Age: 22
End: 2017-06-27

## 2017-06-27 NOTE — TELEPHONE ENCOUNTER
Actions Requested: appt made today at 9:50am with DR MEREDITH. If patient feels symptoms worsening, will go to ER.    Situation/Background   Problem: chest pain   Onset: feels episodes daily    Associated Symptoms: feels nauseated with pain;  Dizzy, feels hot; ch

## 2017-07-08 ENCOUNTER — OFFICE VISIT (OUTPATIENT)
Dept: FAMILY MEDICINE CLINIC | Facility: CLINIC | Age: 22
End: 2017-07-08

## 2017-07-08 ENCOUNTER — TELEPHONE (OUTPATIENT)
Dept: INTERNAL MEDICINE CLINIC | Facility: CLINIC | Age: 22
End: 2017-07-08

## 2017-07-08 VITALS
RESPIRATION RATE: 16 BRPM | HEIGHT: 66 IN | DIASTOLIC BLOOD PRESSURE: 74 MMHG | SYSTOLIC BLOOD PRESSURE: 110 MMHG | BODY MASS INDEX: 29.89 KG/M2 | HEART RATE: 68 BPM | TEMPERATURE: 99 F | WEIGHT: 186 LBS

## 2017-07-08 DIAGNOSIS — F41.9 ANXIETY: ICD-10-CM

## 2017-07-08 DIAGNOSIS — R51.9 FRONTAL HEADACHE: ICD-10-CM

## 2017-07-08 DIAGNOSIS — L30.9 DERMATITIS: Primary | ICD-10-CM

## 2017-07-08 PROCEDURE — 99214 OFFICE O/P EST MOD 30 MIN: CPT | Performed by: FAMILY MEDICINE

## 2017-07-08 PROCEDURE — 99212 OFFICE O/P EST SF 10 MIN: CPT | Performed by: FAMILY MEDICINE

## 2017-07-08 RX ORDER — HYDROXYZINE HYDROCHLORIDE 10 MG/1
10 TABLET, FILM COATED ORAL NIGHTLY PRN
Qty: 10 TABLET | Refills: 0 | Status: SHIPPED | OUTPATIENT
Start: 2017-07-08 | End: 2017-07-18

## 2017-07-08 RX ORDER — DEXAMETHASONE 4 MG/1
4 TABLET ORAL
Qty: 4 TABLET | Refills: 0 | Status: SHIPPED | OUTPATIENT
Start: 2017-07-08 | End: 2017-07-12

## 2017-07-08 NOTE — PROGRESS NOTES
Patient ID: Tammie Chambers is a 25year old female. HPI  Patient presents with:  Headache  Rash: stomach and back   Anxiety   she is seeing psychiatry for anxiety. She states 2 days ago she started getting a rash on her thighs, left breast, back.   D not currently breastfeeding. Physical Exam   Constitutional: Patient is oriented to person, place, and time. Patient appears well-developed and well-nourished. Physical Exam   Constitutional: Patient is oriented to person, place, and time.  Patient topically 2 (two) times daily as needed. Let start Decadron for 4 days. Hydroxyzine at bedtime. Triamcinolone as needed. Frontal headache  -     dexamethasone (DECADRON) 4 MG tablet;  Take 1 tablet (4 mg total) by mouth daily with breakfast.  -     Hydr

## 2017-07-08 NOTE — TELEPHONE ENCOUNTER
Actions Requested: recommendation  Problem: Bug bites ,itchy rash on back , chest and stomach ,severe headache,chest pain and dizziness. . No fever.     Onset and Timin week  Associated Symptoms: Bug bites,itchy rash on back , chest and stomach and sever call back if needed. Patient was able to restate instructions in their own words, verbalizes understanding and agrees with plan.

## 2017-07-08 NOTE — TELEPHONE ENCOUNTER
If she has not been seen by the emergency room, have her see me instead. She has been worked up in the emergency room numerous times for anxiety related chest pain.

## 2017-08-09 NOTE — TELEPHONE ENCOUNTER
Pt asking to speak with a nurse or Dr. Brian Calderon to discuss seeing psychiatry long term. Please advise.

## 2017-08-10 NOTE — TELEPHONE ENCOUNTER
Please advise. Thank you. Pt requesting referral to another Psychiatrist. Pt states that she did not have a good experience with Dr. Lennox Vega.     General psychiatry referral pended for MD approval.    She also states that the doctor suggested that the pt

## 2017-08-15 NOTE — TELEPHONE ENCOUNTER
Pt advised to contact [facility] for self referral to find new provider. Referral for psychiatrist was cancelled by [facility] care due to pt being able to self refer.

## 2017-08-17 ENCOUNTER — NURSE TRIAGE (OUTPATIENT)
Dept: OTHER | Age: 22
End: 2017-08-17

## 2017-08-17 NOTE — TELEPHONE ENCOUNTER
Action Requested: Summary for Provider     []  Critical Lab, Recommendations Needed  [] Need Additional Advice  [x]   FYI    []   Need Orders  [] Need Medications Sent to Pharmacy  []  Other     SUMMARY: Pt contacts clinic with symptoms as below occurring Health Maintenance Summary     Topic Due On Due Status Completed On Postpone Until Reason    MAMMOGRAM - BREAST CANCER SCREENING May 21, 2011 Overdue May 21, 2009      Colorectal Cancer Screening - Colonoscopy May 21, 2019 Not Due May 21, 2009      Osteoporosis Screening  Completed Jul 29, 2016      Immunization-Zoster Oct 2, 2006 Overdue       Immunization - Pneumococcal Jul 11, 2017 Overdue Jul 11, 2016      Immunization - TDAP Pregnancy  Hidden       Medicare Wellness Visit Oct 2, 2011 Overdue       IMMUNIZATION - DTaP/Tdap/Td Jun 7, 2016 Postponed Jun 7, 2006 Aug 10, 2018 Insurance or Financial    Immunization-Influenza Sep 1, 2017 Not Due Dec 17, 2009            Patient is due for topics as listed above, she wishes to discuss with provider .  Pt refuses all vaccines today but stating will schedule her Medicare Wellness with Dr. Winters.

## 2017-09-12 RX ORDER — NORGESTIMATE-ETHINYL ESTRADIOL 7DAYSX3 28
TABLET ORAL
Qty: 28 TABLET | Refills: 0 | Status: SHIPPED | OUTPATIENT
Start: 2017-09-12 | End: 2017-10-09

## 2017-09-12 NOTE — TELEPHONE ENCOUNTER
Gynecology Medications  Protocol Criteria:  · Appointment scheduled in the past 12 months or the next 3 months  · Pap smear in the past 12 months  · Pap smear WNL manually verified  Recent Outpatient Visits            2 months ago Dermatitis    [facility]

## 2017-09-28 ENCOUNTER — TELEPHONE (OUTPATIENT)
Dept: OTHER | Age: 22
End: 2017-09-28

## 2017-09-28 NOTE — TELEPHONE ENCOUNTER
Pt seeking appt for Hx of Anxiety,itching-want to discuss meds/Tx-has a counselor was prescribed Valium next appt 10/10,feels ok now but wondering why she has these episodes appt made for tomorrow-advised Call back or go straight to ER if s/sx worsen, ques

## 2017-09-29 ENCOUNTER — LAB ENCOUNTER (OUTPATIENT)
Dept: LAB | Age: 22
End: 2017-09-29
Attending: FAMILY MEDICINE
Payer: COMMERCIAL

## 2017-09-29 DIAGNOSIS — L30.9 DERMATITIS: ICD-10-CM

## 2017-09-29 DIAGNOSIS — L29.9 GENERALIZED PRURITUS: ICD-10-CM

## 2017-09-29 PROCEDURE — 80076 HEPATIC FUNCTION PANEL: CPT

## 2017-09-29 PROCEDURE — 36415 COLL VENOUS BLD VENIPUNCTURE: CPT

## 2017-09-29 PROCEDURE — 85025 COMPLETE CBC W/AUTO DIFF WBC: CPT

## 2017-09-29 NOTE — PATIENT INSTRUCTIONS
I would start taking 2 of the Effexor 75 mg daily to equal 150 mg total and then make sure to see her psychiatrist on October 10, 2017

## 2017-09-29 NOTE — PROGRESS NOTES
Patient ID: Liya Clinton is a 25year old female. HPI  Patient presents with: Anxiety  She does see psychiatry but her next appointment is October 10, 2017.   One week ago she called our office that she was very anxious and they called in some Valium surgical history. Current Outpatient Prescriptions:  diazepam 2 MG Oral Tab Take 1 tablet (2 mg total) by mouth 3 (three) times daily as needed for Anxiety.  Disp: 30 tablet Rfl: 0   TRINESSA, 28, 0.18/0.215/0.25 MG-35 MCG Oral Tab TAKE 1 TABLET BY She has no rash seen at this time  Psychiatric: Patient has a anxious affect. Rapid speech. No suicidal ideations. No depression. Vitals reviewed.          ASSESSMENT/PLAN:     Diagnoses and all orders for this visit:    Anxiety  Patient Instructions

## 2017-10-07 ENCOUNTER — NURSE TRIAGE (OUTPATIENT)
Dept: OTHER | Age: 22
End: 2017-10-07

## 2017-10-07 NOTE — TELEPHONE ENCOUNTER
Action Requested: Summary for Provider     []  Critical Lab, Recommendations Needed  [] Need Additional Advice  []   FYI    []   Need Orders  [] Need Medications Sent to Pharmacy  []  Other     SUMMARY: Pt stts feels like her anxiety medicine is not workin

## 2017-10-07 NOTE — TELEPHONE ENCOUNTER
I called pt back and advised her to go to [facility] in [place]  today and pt agreed. Pt will go after work.

## 2017-10-09 ENCOUNTER — APPOINTMENT (OUTPATIENT)
Dept: GENERAL RADIOLOGY | Facility: HOSPITAL | Age: 22
End: 2017-10-09
Attending: EMERGENCY MEDICINE
Payer: COMMERCIAL

## 2017-10-09 ENCOUNTER — HOSPITAL ENCOUNTER (EMERGENCY)
Facility: HOSPITAL | Age: 22
Discharge: HOME | End: 2017-10-09
Attending: EMERGENCY MEDICINE
Payer: COMMERCIAL

## 2017-10-09 VITALS
RESPIRATION RATE: 16 BRPM | HEIGHT: 66 IN | HEART RATE: 86 BPM | OXYGEN SATURATION: 100 % | TEMPERATURE: 99 F | WEIGHT: 186 LBS | SYSTOLIC BLOOD PRESSURE: 143 MMHG | DIASTOLIC BLOOD PRESSURE: 99 MMHG | BODY MASS INDEX: 29.89 KG/M2

## 2017-10-09 DIAGNOSIS — R07.9 ACUTE CHEST PAIN: Primary | ICD-10-CM

## 2017-10-09 PROCEDURE — 93005 ELECTROCARDIOGRAM TRACING: CPT

## 2017-10-09 PROCEDURE — 93010 ELECTROCARDIOGRAM REPORT: CPT | Performed by: EMERGENCY MEDICINE

## 2017-10-09 PROCEDURE — 80048 BASIC METABOLIC PNL TOTAL CA: CPT | Performed by: EMERGENCY MEDICINE

## 2017-10-09 PROCEDURE — 85025 COMPLETE CBC W/AUTO DIFF WBC: CPT | Performed by: EMERGENCY MEDICINE

## 2017-10-09 PROCEDURE — 85379 FIBRIN DEGRADATION QUANT: CPT | Performed by: EMERGENCY MEDICINE

## 2017-10-09 PROCEDURE — 71010 XR CHEST AP PORTABLE  (CPT=71010): CPT | Performed by: EMERGENCY MEDICINE

## 2017-10-09 PROCEDURE — 99285 EMERGENCY DEPT VISIT HI MDM: CPT

## 2017-10-09 PROCEDURE — 36415 COLL VENOUS BLD VENIPUNCTURE: CPT

## 2017-10-09 RX ORDER — NAPROXEN 500 MG/1
500 TABLET ORAL 2 TIMES DAILY PRN
Qty: 20 TABLET | Refills: 0 | Status: SHIPPED | OUTPATIENT
Start: 2017-10-09 | End: 2017-10-16

## 2017-10-09 RX ORDER — IBUPROFEN 600 MG/1
600 TABLET ORAL ONCE
Status: COMPLETED | OUTPATIENT
Start: 2017-10-09 | End: 2017-10-09

## 2017-10-09 RX ORDER — NORGESTIMATE-ETHINYL ESTRADIOL 7DAYSX3 28
TABLET ORAL
Qty: 28 TABLET | Refills: 0 | Status: SHIPPED | OUTPATIENT
Start: 2017-10-09 | End: 2017-11-09

## 2017-10-09 RX ORDER — ACETAMINOPHEN 500 MG
1000 TABLET ORAL ONCE
Status: COMPLETED | OUTPATIENT
Start: 2017-10-09 | End: 2017-10-09

## 2017-10-09 NOTE — TELEPHONE ENCOUNTER
Patient failed protocol, no record of PAP. Script pended. Please advise.       Gynecology Medications  Protocol Criteria:  · Appointment scheduled in the past 12 months or the next 3 months  · Pap smear in the past 12 months  · Pap smear WNL manually verifi

## 2017-10-10 NOTE — ED PROVIDER NOTES
Patient Seen in: [facility] AND [facility] Emergency Department    History   Patient presents with: Anxiety/Panic attack (neurologic)    Stated Complaint: palpitations    HPI    25year old female presenting with chest pain. Pain began couple of weeks ago.  The Grandmother    • Diabetes Maternal Grandfather    • Heart Disease Maternal Grandfather        Smoking status: Never Smoker                                                              Smokeless tobacco: Never Used                      Alcohol use:  No vs. GI related pathology such as GERD or gastritis, vs. Musculoskeletal        ED Course     Labs Reviewed   BASIC METABOLIC PANEL (8) - Abnormal; Notable for the following:        Result Value    Glucose 103 (*)     All other components within normal limi [id number]. Aortic Dissection Risk Assesment:    High risk Conditions (Marfan, Fhx,Known aortic valve disease, known dissection or recent aortic manipulation) no  High Risk Pain Features (Severe.  Abrupt Ripping or tearing) no  High Risk Exam Features (pul

## 2017-10-10 NOTE — ED INITIAL ASSESSMENT (HPI)
Left sided chest pain that is described as tightness, pressure and is constant. Hx anxiety. Denies SI/HI. Pt does have recent self inflicted scratches to the abd.

## 2017-10-19 ENCOUNTER — NURSE TRIAGE (OUTPATIENT)
Dept: OTHER | Age: 22
End: 2017-10-19

## 2017-10-19 NOTE — TELEPHONE ENCOUNTER
[facility] Family Medicine Office Note  Chief Complaint:   Patient presents with:  Swelling: katlyn calf and ankle swelling x months      HPI:   This is a 49year old female coming in for bilateral calf and ankle swelling for the past few months.   P Action Requested: Summary for Provider     []  Critical Lab, Recommendations Needed  [] Need Additional Advice  []   FYI    []   Need Orders  [] Need Medications Sent to Pharmacy  [x]  Other     SUMMARY: Advised to go to ER and agrees    Patient called a Cancer Maternal Aunt      bladder ca   • Cancer Maternal Aunt      thyroid ca   • Cancer Maternal Aunt      colon ca     Allergies:    Biaxin [Clarithromy*      Erythromycin            Nausea and vomiting  Ibuprofen               Nausea and vomiting  Penic apparent distress  HEAD:  Normocephalic, atraumatic  LUNGS: clear to auscultation bilterally, no rales/rhonchi/wheezing  HEART:  Regular rate and rhythm, no murmurs, rubs or gallops  ABDOMEN:  Soft, nondistended, nontender, bowel sounds normal in all 4 rashad Urinary frequency     Urgency of urination     Dysthymic disorder     Palpitations     Nontoxic uninodular goiter     Headache(784.0)     Major depressive disorder, recurrent episode, unspecified     Leg pain     Shoulder pain     Major depressive disorder

## 2017-10-20 NOTE — ED INITIAL ASSESSMENT (HPI)
Patient spoke with crisis line today telling her to come to ED---Patient started 2 weeks ago when she had a plan to drive off the road and kill herself. Decided not to do that, but cut her right thigh and lower abdominal area at that time.     Patient repor

## 2017-10-21 PROBLEM — F33.2 SEVERE RECURRENT MAJOR DEPRESSION WITHOUT PSYCHOTIC FEATURES (HCC): Status: ACTIVE | Noted: 2017-10-21

## 2017-10-21 PROBLEM — F32.2 MAJOR DEPRESSIVE DISORDER, SEVERE (HCC): Status: ACTIVE | Noted: 2017-10-21

## 2017-10-21 NOTE — TELEPHONE ENCOUNTER
LMTCB, please transfer to RN triage.     From ER visit notes:    MDM      Patient medically cleared for psychiatric hospitalization        Disposition and Plan      Clinical Impression:  Anxiety  (primary encounter diagnosis)  Depressive disorder     Dispos

## 2017-10-21 NOTE — ED PROVIDER NOTES
Patient Seen in: [facility] AND [facility] Emergency Department    History   Patient presents with:  Claire-P (psychiatric)    Stated Complaint: SI w/ cutting    HPI    Patient a 52-year-old female with a history of anxiety disorder she states that the last 2 week BMI 31.45 kg/m²         Physical Exam    Constitutional: Oriented to person, place, and time. Appears rather anxious. D. HEENT:   Head: Normocephalic and atraumatic.    Right Ear: External ear normal.   Left Ear: External ear normal.   Nose: Nose normal POCT PREGNANCY URINE - Normal   CBC WITH DIFFERENTIAL WITH PLATELET    Narrative: The following orders were created for panel order CBC WITH DIFFERENTIAL WITH PLATELET.   Procedure                               Abnormality         Status

## 2017-10-21 NOTE — BH PROGRESS NOTE
Pre-Cert Information    Called 21  with [facility]  They do not manage this plan instructed to call number on front of card    Called [phone] four times and got busy signal each time  On fifth try called and the phone just rang

## 2017-10-21 NOTE — BH LEVEL OF CARE ASSESSMENT
Level of Care Assessment Note    General Questions  Why are you here?: \"Two weeks ago I had thoughts of killing myself, but I cut myself instead. And then yesterday I had a really bad day with work and then my dad was yelling at me.  So I took a knife from Concern: None reported     Referral Source  Referral Source: Peggy 3: [place]  Current/Recent Suicidal Ideation: Yes  Date of Most Recent Suicidal Ideation: 10/19/17  Describe Current/Recent Suicidal Ideation: Today pt Suicide Risk Collateral Provided By[de-identified] No family present   Describe Past Suicide Risk Collateral: None reported     Danger to Others/Property  Current/Recent Harm Toward Others: No  Past Harm Toward Others: No  Current or Past Harm Toward Animals: No  Histo observed  Delusions: No problems reported or observed  Depression Symptoms: Appetite change; Change in energy level; Increased irritability;Crying; Impaired concentration; Feelings of helplessness; Feelings of hopelessess; Feelings of worthlessness  Depression D than as prescribed) in the past 30 days?: No                     Withdrawal Symptoms  History of Withdrawal Symptoms: Denies past symptoms  Current Withdrawal Symptoms: No  Process Addiction/ Behaviors  Repetitive/Compulsive Behaviors in the past 30 days: (Comment)    Assessment Summary  Assessment Summary: Pt is a 25year old female who presented to the ER for debilitating anxiety, SIB, and SI.  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

## 2017-10-24 ENCOUNTER — TELEPHONE (OUTPATIENT)
Dept: FAMILY MEDICINE CLINIC | Facility: CLINIC | Age: 22
End: 2017-10-24

## 2017-10-24 NOTE — TELEPHONE ENCOUNTER
Pt was in the er yesterday and needs a note to go back to work tomorrow. She can pick it up,when ready. Pls advise. Thank you.

## 2017-11-02 ENCOUNTER — TELEPHONE (OUTPATIENT)
Dept: OTHER | Age: 22
End: 2017-11-02

## 2017-11-02 NOTE — TELEPHONE ENCOUNTER
Pt stts she has pleurisy for a while, it came back a month ago.   Pt asking what can she do to alleviate the symptoms as (rt lung) because the pain aggravates her anxiety    Currently when pt gets the pain she tries to change her position but it's hard to d

## 2017-11-03 NOTE — TELEPHONE ENCOUNTER
Spoke to pt informed  VS message below. Pt agreed, states she saw her psychiatrist on Tuesday and will follow up with him again in a couple weeks.

## 2017-11-03 NOTE — TELEPHONE ENCOUNTER
She had a D-dimer test which seems to rule out a pulmonary embolus. Her chest x-ray was clear. Many times pleurisy can just be due to anxiety and because she is breathing too heavily, especially during her panic attacks.   She needs to take care of hersel

## 2017-11-10 RX ORDER — NORGESTIMATE-ETHINYL ESTRADIOL 7DAYSX3 28
TABLET ORAL
Qty: 28 TABLET | Refills: 3 | Status: SHIPPED | OUTPATIENT
Start: 2017-11-10 | End: 2018-03-13

## 2017-11-11 ENCOUNTER — APPOINTMENT (OUTPATIENT)
Dept: GENERAL RADIOLOGY | Facility: HOSPITAL | Age: 22
End: 2017-11-11
Attending: EMERGENCY MEDICINE
Payer: COMMERCIAL

## 2017-11-11 ENCOUNTER — HOSPITAL ENCOUNTER (EMERGENCY)
Facility: HOSPITAL | Age: 22
Discharge: HOME | End: 2017-11-11
Attending: EMERGENCY MEDICINE
Payer: COMMERCIAL

## 2017-11-11 VITALS
OXYGEN SATURATION: 99 % | BODY MASS INDEX: 30.53 KG/M2 | RESPIRATION RATE: 22 BRPM | HEIGHT: 66 IN | TEMPERATURE: 99 F | SYSTOLIC BLOOD PRESSURE: 137 MMHG | DIASTOLIC BLOOD PRESSURE: 85 MMHG | WEIGHT: 190 LBS | HEART RATE: 87 BPM

## 2017-11-11 DIAGNOSIS — R07.89 CHEST PAIN, ATYPICAL: Primary | ICD-10-CM

## 2017-11-11 PROCEDURE — 99285 EMERGENCY DEPT VISIT HI MDM: CPT

## 2017-11-11 PROCEDURE — 93005 ELECTROCARDIOGRAM TRACING: CPT

## 2017-11-11 PROCEDURE — 80048 BASIC METABOLIC PNL TOTAL CA: CPT | Performed by: EMERGENCY MEDICINE

## 2017-11-11 PROCEDURE — 84484 ASSAY OF TROPONIN QUANT: CPT | Performed by: EMERGENCY MEDICINE

## 2017-11-11 PROCEDURE — 71020 XR CHEST PA + LAT CHEST (CPT=71020): CPT | Performed by: EMERGENCY MEDICINE

## 2017-11-11 PROCEDURE — 93010 ELECTROCARDIOGRAM REPORT: CPT | Performed by: EMERGENCY MEDICINE

## 2017-11-11 PROCEDURE — 96375 TX/PRO/DX INJ NEW DRUG ADDON: CPT

## 2017-11-11 PROCEDURE — 96374 THER/PROPH/DIAG INJ IV PUSH: CPT

## 2017-11-11 PROCEDURE — 85025 COMPLETE CBC W/AUTO DIFF WBC: CPT | Performed by: EMERGENCY MEDICINE

## 2017-11-11 RX ORDER — KETOROLAC TROMETHAMINE 30 MG/ML
30 INJECTION, SOLUTION INTRAMUSCULAR; INTRAVENOUS ONCE
Status: COMPLETED | OUTPATIENT
Start: 2017-11-11 | End: 2017-11-11

## 2017-11-11 RX ORDER — LORAZEPAM 2 MG/ML
0.5 INJECTION INTRAMUSCULAR ONCE
Status: COMPLETED | OUTPATIENT
Start: 2017-11-11 | End: 2017-11-11

## 2017-11-12 NOTE — ED PROVIDER NOTES
Patient Seen in: [facility] AND [facility] Emergency Department    History   No chief complaint on file. Stated Complaint: CP x1 day BYRON    HPI    Patient presents with complaint of chest pain.   She is currently here with her boyfriend she has multiple previo no vomiting  Musculoskeletal: no back pain    Positive for stated complaint: CP x1 day BYRON  Other systems are as noted in HPI. Constitutional and vital signs reviewed. All other systems reviewed and negative except as noted above.     PSFH elements re to return    ED Course     Labs Reviewed   BASIC METABOLIC PANEL (8) - Abnormal; Notable for the following:        Result Value    Sodium 135 (*)     BUN/CREA Ratio 9.9 (*)     All other components within normal limits   CBC W/ DIFFERENTIAL - Abnormal; Not

## 2017-12-13 ENCOUNTER — NURSE TRIAGE (OUTPATIENT)
Dept: OTHER | Age: 22
End: 2017-12-13

## 2017-12-13 NOTE — ED INITIAL ASSESSMENT (HPI)
Via medics from home---feeling anxious, panicky since last night.  Stopped taking amytriptyline 3 days ago because she thought she was \"fine\"    [facility]

## 2017-12-13 NOTE — TELEPHONE ENCOUNTER
Action Requested: Summary for Provider     []  Critical Lab, Recommendations Needed  [] Need Additional Advice  [x]   FYI    []   Need Orders  [] Need Medications Sent to Pharmacy  []  Other     SUMMARY: pt called reports 3 days ago abruptly stopped all he

## 2017-12-13 NOTE — BH LEVEL OF CARE ASSESSMENT
Level of Care Assessment Note    General Questions  Why are you here?: \"I stopped taking my medication three days ago because I thought I was okay nd did not need to take it anymore. I was fine the day after but after that I woke up sweating and nauseous. Risk Mitigating Factors: dogs and some friends  Past Suicide Risk Collateral Provided By[de-identified] n/a  Describe Past Suicide Risk Collateral: n/a    Danger to Others/Property  Current/Recent Harm Toward Others: No  Past Harm Toward Others: No  Current or Past Parmjit denied)  Sleep Pattern: Sleeps all night  Number of Sleep Hours: 6 Hours  Use of Sleep Aids: n/a  Appetite Symptoms: Normal for patient  Unplanned Weight Loss: No  Unplanned Weight Gain: No  History of Eating Disorder: No  Active Eating Disorder: No Denies  Verbal Abuse: Denies  Sexual Abuse: Denies  Neglect: Denies  Does anyone say or do something to you that makes you feel unsafe?: No  Have You Ever Been Harmed by a Partner/Caregiver?: No  Health Concerns r/t Abuse: No    Mental Status  Appearance C Recommendations: Therapy; Medication management  Referral 1: [facility]  Medical Precautions: None  Refused Treatment: No  Education Provided: Call 911 in an Emergency;[facility] Crisis Line Number;Advised to call if condition worsens; Advised to call with questions  Tra

## 2017-12-13 NOTE — ED PROVIDER NOTES
Patient Seen in: [facility] AND [facility] Emergency Department    History   Patient presents with: Anxiety/Panic attack (neurologic)    Stated Complaint: anxiety    HPI    Patient is a 81-year-old female who presents with panic attack.   Patient states that she warm, dry, no rashes        ED Course   Labs Reviewed - No data to display    ED Course as of Dec 13 1510  ------------------------------------------------------------       [facility]   Pt received ativan PO.  Evaluated by Rigoberto Hernandez psych tech and given further refer

## 2017-12-14 NOTE — TELEPHONE ENCOUNTER
Patient did go to [facility] ER yesterday, she was advised to contact [facility], resume meds f/u with PCP  She continues to experience nausea/diarrhea, emesis/diarrhea x4 over night,

## 2017-12-14 NOTE — TELEPHONE ENCOUNTER
Dr Shasha Tomas, patient did go to ER yesterday for anxiety, continues to experience vomiting/diarrhea, emesis/diarrhea x4 today, can you see her tomorrow between 0830 and 1200 or should I find another provider

## 2017-12-15 NOTE — TELEPHONE ENCOUNTER
Spoke with patient and informed her Dr. Nohemi Adkins wants her to be seen today. Call was then disconnected. An appointment was scheduled for today 12/15/17 at 9:50am. Call placed to patient; no answer left message informing her of appointment.

## 2017-12-15 NOTE — TELEPHONE ENCOUNTER
Attempt made to contact patient; no answer left message informing patient of appointment scheduled for today at 9:50am.

## 2017-12-16 NOTE — TELEPHONE ENCOUNTER
No call back from pt after 2 staff call attempts, again no answer and LMTCB. Also sent this message to pt via [employer]. This pt was seen at [facility] ED 12/13/17 for anxiety and noncompliance of medication.

## 2017-12-29 ENCOUNTER — HOSPITAL ENCOUNTER (EMERGENCY)
Facility: HOSPITAL | Age: 22
Discharge: HOME | End: 2017-12-29
Attending: EMERGENCY MEDICINE
Payer: COMMERCIAL

## 2017-12-29 VITALS
HEIGHT: 66 IN | TEMPERATURE: 100 F | OXYGEN SATURATION: 98 % | SYSTOLIC BLOOD PRESSURE: 141 MMHG | BODY MASS INDEX: 28.93 KG/M2 | WEIGHT: 180 LBS | RESPIRATION RATE: 18 BRPM | HEART RATE: 90 BPM | DIASTOLIC BLOOD PRESSURE: 89 MMHG

## 2017-12-29 DIAGNOSIS — F32.A DEPRESSION, UNSPECIFIED DEPRESSION TYPE: Primary | ICD-10-CM

## 2017-12-29 LAB
ANION GAP SERPL CALC-SCNC: 9 MMOL/L (ref 0–18)
B-HCG UR QL: NEGATIVE
BASOPHILS # BLD: 0 K/UL (ref 0–0.2)
BASOPHILS NFR BLD: 0 %
BILIRUB UR QL: NEGATIVE
BUN SERPL-MCNC: 9 MG/DL (ref 8–20)
BUN/CREAT SERPL: 11.5 (ref 10–20)
CALCIUM SERPL-MCNC: 8.6 MG/DL (ref 8.5–10.5)
CHLORIDE SERPL-SCNC: 101 MMOL/L (ref 95–110)
CLARITY UR: CLEAR
CO2 SERPL-SCNC: 26 MMOL/L (ref 22–32)
COLOR UR: YELLOW
CREAT SERPL-MCNC: 0.78 MG/DL (ref 0.5–1.5)
EOSINOPHIL # BLD: 0.3 K/UL (ref 0–0.7)
EOSINOPHIL NFR BLD: 4 %
ERYTHROCYTE [DISTWIDTH] IN BLOOD BY AUTOMATED COUNT: 13.2 % (ref 11–15)
ETHANOL SERPL-MCNC: 2 MG/DL
GLUCOSE SERPL-MCNC: 101 MG/DL (ref 70–99)
GLUCOSE UR-MCNC: NEGATIVE MG/DL
HCT VFR BLD AUTO: 37.6 % (ref 35–48)
HGB BLD-MCNC: 12.5 G/DL (ref 12–16)
KETONES UR-MCNC: NEGATIVE MG/DL
LYMPHOCYTES # BLD: 1.7 K/UL (ref 1–4)
LYMPHOCYTES NFR BLD: 21 %
MCH RBC QN AUTO: 26.5 PG (ref 27–32)
MCHC RBC AUTO-ENTMCNC: 33.4 G/DL (ref 32–37)
MCV RBC AUTO: 79.4 FL (ref 80–100)
MONOCYTES # BLD: 0.7 K/UL (ref 0–1)
MONOCYTES NFR BLD: 9 %
NEUTROPHILS # BLD AUTO: 5.4 K/UL (ref 1.8–7.7)
NEUTROPHILS NFR BLD: 66 %
NITRITE UR QL STRIP.AUTO: NEGATIVE
OSMOLALITY UR CALC.SUM OF ELEC: 281 MOSM/KG (ref 275–295)
PH UR: 7 [PH] (ref 5–8)
PLATELET # BLD AUTO: 218 K/UL (ref 140–400)
PMV BLD AUTO: 7.8 FL (ref 7.4–10.3)
POTASSIUM SERPL-SCNC: 3.1 MMOL/L (ref 3.3–5.1)
PROT UR-MCNC: NEGATIVE MG/DL
RBC # BLD AUTO: 4.73 M/UL (ref 3.7–5.4)
RBC #/AREA URNS AUTO: 2 /HPF
SODIUM SERPL-SCNC: 136 MMOL/L (ref 136–144)
SP GR UR STRIP: 1.01 (ref 1–1.03)
UROBILINOGEN UR STRIP-ACNC: <2
VIT C UR-MCNC: NEGATIVE MG/DL
WBC # BLD AUTO: 8.2 K/UL (ref 4–11)
WBC #/AREA URNS AUTO: 6 /HPF

## 2017-12-29 PROCEDURE — 87086 URINE CULTURE/COLONY COUNT: CPT | Performed by: EMERGENCY MEDICINE

## 2017-12-29 PROCEDURE — 81001 URINALYSIS AUTO W/SCOPE: CPT | Performed by: EMERGENCY MEDICINE

## 2017-12-29 PROCEDURE — 85025 COMPLETE CBC W/AUTO DIFF WBC: CPT | Performed by: EMERGENCY MEDICINE

## 2017-12-29 PROCEDURE — 36415 COLL VENOUS BLD VENIPUNCTURE: CPT

## 2017-12-29 PROCEDURE — 99284 EMERGENCY DEPT VISIT MOD MDM: CPT

## 2017-12-29 PROCEDURE — 80048 BASIC METABOLIC PNL TOTAL CA: CPT | Performed by: EMERGENCY MEDICINE

## 2017-12-29 PROCEDURE — 80320 DRUG SCREEN QUANTALCOHOLS: CPT | Performed by: EMERGENCY MEDICINE

## 2017-12-29 PROCEDURE — 81025 URINE PREGNANCY TEST: CPT

## 2017-12-29 RX ORDER — GABAPENTIN 100 MG/1
200 CAPSULE ORAL 2 TIMES DAILY
COMMUNITY
End: 2019-04-05

## 2017-12-29 RX ORDER — AMITRIPTYLINE HYDROCHLORIDE 25 MG/1
25 TABLET, FILM COATED ORAL NIGHTLY
COMMUNITY
End: 2019-05-06

## 2017-12-30 NOTE — BH LEVEL OF CARE ASSESSMENT
Level of Care Assessment Note    General Questions  Why are you here?: Pt reports feeling overwhelmed earlier in the day for forgetting her medication and became anxious.  Pt admits to texting her father she took an extra dose of her medication, but pt bill Plan: No  Current/Recent/Future Suicidal Intent: No  Current/Recent Suicide Rehearsal: No  Suicide Attempt in past 14 days: No  Current/Recent Suicide Risk Mitigating Factors: Pt denies being suicidal   Current/Recent Suicide Risk Collateral Provided By[de-identified] Self-Injurious Behaviors: No    Mental Health Symptoms  Hallucination Type: No problems reported or observed  Depression Symptoms: No problems reported or observed  Depression Description: Pt states she is doing better.    Anxiety Symptoms: Generalized  Pan Concerns/Conflicts with Social Relationships[de-identified] Pt reports issues with trust and this is what is hindering her ability to consistently see providers. Pt admits to not being very open with family.    Decreased Functional Ability:  (Pt denies)  Do you have any safety concerns for the pt and felt safe with the pt coming home. Pt reports having an upcoming appointment with psych at the beginning of January. Pt urged to make apointment with a counselor.  Consulted with the ER doctor and the pt will be discharged wit

## 2017-12-30 NOTE — ED INITIAL ASSESSMENT (HPI)
Pt sent a message to her via text saying that she she was feeling depressed and she took an additional dose of her medications. Pt denies SI/HI. Pt states she was feeling depressed but denies thought of hurting self or others.

## 2017-12-30 NOTE — ED NOTES
Patient to ED via EMS for c/o psychiatric evaluation. Patient is calm and cooperative and denies any SI/HI at this time. Patient admits to text messaging father this after noon that she \"may have taken too much of her psych medications this afternoon\".

## 2018-01-02 NOTE — ED PROVIDER NOTES
Patient Seen in: [facility] AND [facility] Emergency Department    History   Patient presents with:  Eval-P (psychiatric)    Stated Complaint: psych eval    HPI    25year old female with pmh anxiety, depression, and self-injury with cutting behavior who present BMI 29.05 kg/m²         Physical Exam   Constitutional: She is oriented to person, place, and time. She appears well-developed and well-nourished. No distress. HENT:   Head: Normocephalic and atraumatic.    Eyes: Conjunctivae and EOM are normal. Pupils a Abnormality         Status                     ---------                               -----------         ------                     CBC W/ DIFFERENTIAL[[id number]]          Abnormal            Final result                 Please view results for these jairon

## 2018-01-03 ENCOUNTER — TELEPHONE (OUTPATIENT)
Dept: FAMILY MEDICINE CLINIC | Facility: CLINIC | Age: 23
End: 2018-01-03

## 2018-01-03 NOTE — TELEPHONE ENCOUNTER
Pt was transferred to me by CALLI, pt states had been off of medication for a few days. I asked her what her sxs are and she hung up. I tried to reach her back and got her voicemail, LMTCB.   I called her dad, emergency contact and he told me she was at wor

## 2018-01-03 NOTE — TELEPHONE ENCOUNTER
Patient states was off Amitriptyline HCl 25 MG Oral Tab, gabapentin 100 MG Oral Cap, and Venlafaxine HCl ER (EFFEXOR XR) 75 MG Oral Capsule SR 24 Hr which takes for anxiety for 3 days and started back taking yesterday.      Patient states that has had sever

## 2018-01-04 NOTE — TELEPHONE ENCOUNTER
She was in the hospital just recently. She has terrible anxiety. She has had her heart evaluated in the past as well due to her anxiety. She really needs to see her psychiatrist on a regular basis.   Let her know she needs to call her psychiatrist about

## 2018-01-08 NOTE — TELEPHONE ENCOUNTER
Attempt made to contact patient at home number; no answer LMTCB. Attempt made to contact patient at work number spoke with someone who says patient no longer works there.

## 2018-01-09 NOTE — TELEPHONE ENCOUNTER
Unable to reach patient. Will send no response letter. I see [facility] as of 1/8/18.

## 2018-02-27 ENCOUNTER — NURSE TRIAGE (OUTPATIENT)
Dept: OTHER | Age: 23
End: 2018-02-27

## 2018-02-27 NOTE — TELEPHONE ENCOUNTER
Action Requested: Summary for Provider     []  Critical Lab, Recommendations Needed  [] Need Additional Advice  []   FYI    []   Need Orders  [] Need Medications Sent to Pharmacy  []  Other     SUMMARY: patient states she ate 2 slices of bread with Nutella

## 2018-03-01 NOTE — TELEPHONE ENCOUNTER
Pt called back reports she still has a rash, it has not gotten any better or worse. Offered multiple appt tomorrow. Pt declined due to work schedule needs an early appt. No appt available with any provider before 9am Suggested to go to walk in clinic.  Pt a

## 2018-03-05 ENCOUNTER — NURSE TRIAGE (OUTPATIENT)
Dept: OTHER | Age: 23
End: 2018-03-05

## 2018-03-05 NOTE — TELEPHONE ENCOUNTER
Pt called again. Pt states she has had 2 nose bleeds in the last 5 days. First nose bleed lasted about 6-7 minutes. Pt states she put a tissue in her nose to stop the bleeding today.  Advised pt not to put tissue in her nose to stop the bleeding, pt removed

## 2018-03-05 NOTE — TELEPHONE ENCOUNTER
Action Requested: Summary for Provider     []  Critical Lab, Recommendations Needed  [] Need Additional Advice  []   FYI    []   Need Orders  [] Need Medications Sent to Pharmacy  []  Other     SUMMARY: Advised OV today but pt declines; due to work schedul

## 2018-03-05 NOTE — TELEPHONE ENCOUNTER
Non-Urgent Medical Question     From  Osmar Vanegas To Sent  3/5/2018  1:35 PM   I have had a rash for a week now and its on my cheeks are red now from it as well. I don't know what I should do I tried Claritin for 4 days and it didn't help.  I tried googl

## 2018-03-06 ENCOUNTER — OFFICE VISIT (OUTPATIENT)
Dept: FAMILY MEDICINE CLINIC | Facility: CLINIC | Age: 23
End: 2018-03-06

## 2018-03-06 VITALS
RESPIRATION RATE: 18 BRPM | HEIGHT: 66 IN | WEIGHT: 191 LBS | TEMPERATURE: 99 F | DIASTOLIC BLOOD PRESSURE: 86 MMHG | BODY MASS INDEX: 30.7 KG/M2 | SYSTOLIC BLOOD PRESSURE: 124 MMHG

## 2018-03-06 DIAGNOSIS — L29.9 GENERALIZED PRURITUS: ICD-10-CM

## 2018-03-06 DIAGNOSIS — L30.9 DERMATITIS: ICD-10-CM

## 2018-03-06 PROCEDURE — 99213 OFFICE O/P EST LOW 20 MIN: CPT | Performed by: FAMILY MEDICINE

## 2018-03-06 PROCEDURE — 99212 OFFICE O/P EST SF 10 MIN: CPT | Performed by: FAMILY MEDICINE

## 2018-03-06 RX ORDER — PREDNISONE 20 MG/1
TABLET ORAL
Qty: 10 TABLET | Refills: 0 | Status: SHIPPED | OUTPATIENT
Start: 2018-03-06 | End: 2018-04-10

## 2018-03-06 RX ORDER — LEVOCETIRIZINE DIHYDROCHLORIDE 5 MG/1
5 TABLET, FILM COATED ORAL EVERY EVENING
Qty: 30 TABLET | Refills: 0 | Status: SHIPPED | OUTPATIENT
Start: 2018-03-06 | End: 2018-04-10

## 2018-03-06 NOTE — PROGRESS NOTES
Jitendra Dumont is a 25year old female. Patient presents with:  Derm Problem: Patient c/o itchy rash to chest and arms for past 8 days. Pt states rash comes and goes. Denies pain. HPI:   Reports rash off and on for a week. No new medications.  Mostly on Cuff Size: large)   Temp 98.9 °F (37.2 °C) (Tympanic)   Resp 18   Ht 5' 6\" (1.676 m)   Wt 191 lb (86.6 kg)   LMP 02/15/2018 (Exact Date)   BMI 30.83 kg/m²   GENERAL: well developed, well nourished,in no apparent distress  SKIN: excoriations on the lower b

## 2018-03-13 RX ORDER — NORGESTIMATE-ETHINYL ESTRADIOL 7DAYSX3 28
TABLET ORAL
Qty: 28 TABLET | Refills: 0 | Status: SHIPPED | OUTPATIENT
Start: 2018-03-13 | End: 2018-04-10

## 2018-04-10 ENCOUNTER — LAB ENCOUNTER (OUTPATIENT)
Dept: LAB | Age: 23
End: 2018-04-10
Attending: FAMILY MEDICINE
Payer: COMMERCIAL

## 2018-04-10 ENCOUNTER — OFFICE VISIT (OUTPATIENT)
Dept: FAMILY MEDICINE CLINIC | Facility: CLINIC | Age: 23
End: 2018-04-10

## 2018-04-10 VITALS
SYSTOLIC BLOOD PRESSURE: 139 MMHG | BODY MASS INDEX: 31.44 KG/M2 | RESPIRATION RATE: 14 BRPM | WEIGHT: 195.63 LBS | TEMPERATURE: 100 F | HEIGHT: 66 IN | HEART RATE: 83 BPM | DIASTOLIC BLOOD PRESSURE: 85 MMHG

## 2018-04-10 DIAGNOSIS — Z30.09 COUNSELING FOR BIRTH CONTROL, ORAL CONTRACEPTIVES: ICD-10-CM

## 2018-04-10 DIAGNOSIS — F32.A DEPRESSIVE DISORDER: Primary | ICD-10-CM

## 2018-04-10 DIAGNOSIS — R50.9 FEVER, UNSPECIFIED FEVER CAUSE: ICD-10-CM

## 2018-04-10 DIAGNOSIS — R25.9 ABNORMAL INVOLUNTARY MOVEMENT: ICD-10-CM

## 2018-04-10 DIAGNOSIS — F41.9 ANXIETY: ICD-10-CM

## 2018-04-10 PROCEDURE — 36415 COLL VENOUS BLD VENIPUNCTURE: CPT

## 2018-04-10 PROCEDURE — 82607 VITAMIN B-12: CPT

## 2018-04-10 PROCEDURE — 83735 ASSAY OF MAGNESIUM: CPT

## 2018-04-10 PROCEDURE — 99212 OFFICE O/P EST SF 10 MIN: CPT | Performed by: FAMILY MEDICINE

## 2018-04-10 PROCEDURE — 84443 ASSAY THYROID STIM HORMONE: CPT

## 2018-04-10 PROCEDURE — 85025 COMPLETE CBC W/AUTO DIFF WBC: CPT

## 2018-04-10 PROCEDURE — 85652 RBC SED RATE AUTOMATED: CPT

## 2018-04-10 PROCEDURE — 80050 GENERAL HEALTH PANEL: CPT

## 2018-04-10 PROCEDURE — 99214 OFFICE O/P EST MOD 30 MIN: CPT | Performed by: FAMILY MEDICINE

## 2018-04-10 RX ORDER — NORGESTIMATE AND ETHINYL ESTRADIOL 7DAYSX3 28
1 KIT ORAL
Qty: 28 TABLET | Refills: 0 | Status: SHIPPED | OUTPATIENT
Start: 2018-04-10 | End: 2018-11-28

## 2018-04-10 NOTE — PROGRESS NOTES
Patient ID: Ronak Lindsay is a 25year old female. HPI  Patient presents with:  Tics  Contraception    She sees psychiatry every month now. She does have depression and anxiety. She will see her psychiatrist this coming month.   She has no suicidal i 6\" (1.676 m), weight 195 lb 9.6 oz (88.7 kg), last menstrual period 03/14/2018, not currently breastfeeding. Physical Exam   Constitutional: Patient is oriented to person, place, and time. Patient appears well-developed and well-nourished. No distress. stated no. She had no idea she had a fever. I could not find a source for her temperature. She will continue to monitor this and she can take Tylenol or Motrin if she wishes.       Referrals (if applicable)    Orders Placed This Encounter      [facility] - Dr BOSWELL

## 2018-04-17 RX ORDER — NORGESTIMATE-ETHINYL ESTRADIOL 7DAYSX3 28
TABLET ORAL
Qty: 28 TABLET | Refills: 0 | Status: SHIPPED | OUTPATIENT
Start: 2018-04-17 | End: 2018-05-19

## 2018-05-19 NOTE — TELEPHONE ENCOUNTER
Dr Mcintosh Fear to advise on pended Trinessa refill request.    Gynecology Medications  Protocol Criteria:  · Appointment scheduled in the past 12 months or the next 3 months  · Pap smear in the past 12 months  · Pap smear WNL manually verified  Recent Outpatien

## 2018-05-21 RX ORDER — NORGESTIMATE-ETHINYL ESTRADIOL 7DAYSX3 28
TABLET ORAL
Qty: 28 TABLET | Refills: 0 | Status: SHIPPED | OUTPATIENT
Start: 2018-05-21 | End: 2018-06-26

## 2018-06-27 RX ORDER — NORGESTIMATE-ETHINYL ESTRADIOL 7DAYSX3 28
TABLET ORAL
Qty: 28 TABLET | Refills: 0 | Status: SHIPPED | OUTPATIENT
Start: 2018-06-27 | End: 2018-08-13

## 2018-06-27 NOTE — TELEPHONE ENCOUNTER
Gynecology Medications  Protocol Criteria:  · Appointment scheduled in the past 12 months or the next 3 months  · Pap smear in the past 12 months  · Pap smear WNL manually verified  Recent Outpatient Visits            2 months ago Depressive disorder    El

## 2018-08-01 ENCOUNTER — NURSE TRIAGE (OUTPATIENT)
Dept: OTHER | Age: 23
End: 2018-08-01

## 2018-08-01 NOTE — TELEPHONE ENCOUNTER
Spoke with pt who states she had missed x 2 OV with her psychiatrist and now the office won't let her schedule until she pays off the no show fees. Pt scheduled OV with Dr Dhillon Favors 8/3/18 for further evaluation of anxiety.

## 2018-08-01 NOTE — TELEPHONE ENCOUNTER
Action Requested: Summary for Provider     []  Critical Lab, Recommendations Needed  [] Need Additional Advice  [x]   FYI    []   Need Orders  [] Need Medications Sent to Pharmacy  []  Other     SUMMARY: Pt contacts clinic c/o increasing anxiety symptoms.

## 2018-08-02 NOTE — TELEPHONE ENCOUNTER
Okay but she needs to get into psychiatry soon as I am not a psychiatrist and her anxiety clearly needs to be treated by a specialist.  I can still see her on August 3, 2018.

## 2018-08-15 RX ORDER — NORGESTIMATE-ETHINYL ESTRADIOL 7DAYSX3 28
TABLET ORAL
Qty: 84 TABLET | Refills: 0 | Status: SHIPPED | OUTPATIENT
Start: 2018-08-15 | End: 2018-11-21

## 2018-09-24 ENCOUNTER — NURSE TRIAGE (OUTPATIENT)
Dept: OTHER | Age: 23
End: 2018-09-24

## 2018-09-24 NOTE — TELEPHONE ENCOUNTER
Action Requested: Summary for Provider     []  Critical Lab, Recommendations Needed  [] Need Additional Advice  [x]   FYI    []   Need Orders  [] Need Medications Sent to Pharmacy  []  Other     SUMMARY:  Pt stated that she has had pleurisy on the rig

## 2018-09-25 NOTE — TELEPHONE ENCOUNTER
Patient was left a message to call back. Transfer to [phone]    F/u on status. Does she need appt? Chart reviewed. No IC visit noted.

## 2018-09-27 NOTE — TELEPHONE ENCOUNTER
No call back from pt. Marcos message sent asking for call back if still having symptoms or has further questions/cocerns.

## 2018-10-13 NOTE — ED PROVIDER NOTES
Patient presents with: Anxiety/Panic attack (neurologic)      HPI:     Mendez Cantu is a 26year old female who presents with for chief complaint \"pleurisy\" and generalized anxiety  X 2 weeks.   Per patient she has medical history of both these conditi Normocephalic, without obvious abnormality, atraumatic  Eyes: conjunctivae/corneas clear. PERRL, EOM's intact. Fundi benign. Ears: normal TM's and external ear canals both ears  Nose: no discharge, no sinus tenderness.  No nasal flaring  Throat: Normal zainab

## 2018-10-13 NOTE — ED INITIAL ASSESSMENT (HPI)
Pt states history of anxiety. Pt has hx of pleurisy. Pt states no cough. Pt states her episodes are intermittent for the past 2 weeks. Pt states she is on medications for anxiety. Pt states trouble breathing and pain in the lungs.

## 2018-11-20 ENCOUNTER — NURSE TRIAGE (OUTPATIENT)
Dept: OTHER | Age: 23
End: 2018-11-20

## 2018-11-20 NOTE — TELEPHONE ENCOUNTER
Action Requested: Summary for Provider     []  Critical Lab, Recommendations Needed  [x] Need Additional Advice  []   FYI    []   Need Orders  [] Need Medications Sent to Pharmacy  []  Other     SUMMARY: For the last month patient has been getting hot fla

## 2018-11-24 NOTE — TELEPHONE ENCOUNTER
Please review; protocol failed. Requested Prescriptions     Pending Prescriptions Disp Refills   • Norgestim-Eth Estrad Triphasic (TRINESSA, 28,) 0.18/0.215/0.25 MG-35 MCG Oral Tab 84 tablet 0     Sig: Take 1 tablet by mouth once daily.        Last Office

## 2018-11-26 RX ORDER — NORGESTIMATE AND ETHINYL ESTRADIOL 7DAYSX3 28
1 KIT ORAL
Qty: 84 TABLET | Refills: 0 | Status: SHIPPED | OUTPATIENT
Start: 2018-11-26 | End: 2019-02-15

## 2018-11-26 RX ORDER — NORGESTIMATE-ETHINYL ESTRADIOL 7DAYSX3 28
TABLET ORAL
Qty: 84 TABLET | Refills: 0 | OUTPATIENT
Start: 2018-11-26

## 2018-11-26 NOTE — TELEPHONE ENCOUNTER
Pt called in stating that she is supposed to start the pills again this evening, but she is completely out of the medication. Please advise.

## 2018-11-28 ENCOUNTER — LAB ENCOUNTER (OUTPATIENT)
Dept: LAB | Age: 23
End: 2018-11-28
Attending: PHYSICIAN ASSISTANT
Payer: COMMERCIAL

## 2018-11-28 DIAGNOSIS — F41.9 ANXIETY: ICD-10-CM

## 2018-11-28 PROCEDURE — 82607 VITAMIN B-12: CPT

## 2018-11-28 PROCEDURE — 81001 URINALYSIS AUTO W/SCOPE: CPT

## 2018-11-28 PROCEDURE — 82306 VITAMIN D 25 HYDROXY: CPT

## 2018-11-28 PROCEDURE — 84443 ASSAY THYROID STIM HORMONE: CPT

## 2018-11-28 PROCEDURE — 85025 COMPLETE CBC W/AUTO DIFF WBC: CPT

## 2018-11-28 PROCEDURE — 80053 COMPREHEN METABOLIC PANEL: CPT

## 2018-11-28 PROCEDURE — 36415 COLL VENOUS BLD VENIPUNCTURE: CPT

## 2018-11-28 NOTE — PROGRESS NOTES
HPI:     HPI    26year-old female is here in the office complaining of anxiety. Patient was diagnosed of anxiety for one and half year ago. Patient has seen Psychiatrist monthly. Last follow-up was 2 weeks ago and had Gabapentin dose adjustment.  Patient st Age of Onset   • Ovarian Cancer Maternal Grandmother    • Diabetes Maternal Grandfather    • Heart Disease Maternal Grandfather        Social History:   Social History    Socioeconomic History      Marital status: Single      Spouse name: Not on file syncope, weakness, numbness and headaches.         11/28/18  0945 11/28/18  1004   BP: (!) 146/102 (!) 140/98   Pulse: 88    Resp: 16    Temp: 97.4 °F (36.3 °C)    TempSrc: Oral    Weight: 193 lb (87.5 kg)    Height: 5' 6\" (1.676 m)      Body mass index is

## 2018-11-28 NOTE — TELEPHONE ENCOUNTER
Patient calling in with similar symptoms, she believes to be anxiety related, most recent panic attack this past Sunday. Since 7am feeling anxious, shaking, dizziness, nausea.  Reports her previously reported symptoms below are also possibly related to her

## 2018-11-28 NOTE — ASSESSMENT & PLAN NOTE
Start Ativan 0.5 mg PO Q8 hours PRN. Continue current management. Advise patient to follow-up with Psychiatrist. Patient voiced understanding. Lab work -up ordered.

## 2018-11-29 ENCOUNTER — PATIENT MESSAGE (OUTPATIENT)
Dept: FAMILY MEDICINE CLINIC | Facility: CLINIC | Age: 23
End: 2018-11-29

## 2018-11-30 ENCOUNTER — TELEPHONE (OUTPATIENT)
Dept: OTHER | Age: 23
End: 2018-11-30

## 2018-11-30 DIAGNOSIS — R23.2 HOT FLASHES: Primary | ICD-10-CM

## 2018-11-30 NOTE — TELEPHONE ENCOUNTER
From: Joss Saldana  To: Joselyn Pabon DO  Sent: 11/29/2018 7:18 PM CST  Subject: Non-Urgent Medical Question    I was just in yesterday for a 3 1/2 hour panic attack i was having and I've had D+ at least 6 times since yesterday.  Also my face is super h

## 2018-11-30 NOTE — TELEPHONE ENCOUNTER
Pt states she would like referral to see endocrinologist.  Referral pended. Note      Called and talked to patient at 8:45 am today.  Patient complaints of anxiety and feels her face is hot, her face is getting better aft

## 2018-11-30 NOTE — TELEPHONE ENCOUNTER
Called and talked to patient at 8:45 am today. Patient complaints of anxiety and feels her face is hot, her face is getting better after she applied ice.  Patient said that she does not drop off the Ativan script that I prescribed at last visit and does not

## 2018-12-01 ENCOUNTER — PATIENT MESSAGE (OUTPATIENT)
Dept: FAMILY MEDICINE CLINIC | Facility: CLINIC | Age: 23
End: 2018-12-01

## 2018-12-01 NOTE — TELEPHONE ENCOUNTER
Left detailed message for patient (auth on file under Thuan) stating referral to endo entered as requested. To call their office for appt. # provided. advised to call back with questions or concerns.

## 2018-12-01 NOTE — TELEPHONE ENCOUNTER
From: Norma Vela  To: Sam Manzo PA-C  Sent: 12/1/2018 12:17 PM CST  Subject: Visit Follow-up Question    Ever since I had that 3 1/2 hour panic attack i have a huge bruise on my hand. Also i saw a weird one on my back and leg as well.  I know i

## 2018-12-03 ENCOUNTER — NURSE TRIAGE (OUTPATIENT)
Dept: OTHER | Age: 23
End: 2018-12-03

## 2018-12-03 NOTE — TELEPHONE ENCOUNTER
Action Requested: Summary for Provider     []  Critical Lab, Recommendations Needed  [x] Need Additional Advice  [x]   FYI    []   Need Orders  [] Need Medications Sent to Pharmacy  []  Other     SUMMARY: Pt c/o left wrist pain with 4 days duration.  No red wrist pain, no reddness, swelling or injury. Rt anterior hand bruising 1.5 inches long. No injury. )  Are these symptoms new, recurrent, or chronic?: new  Precipitated by: no precipitating factors  Aggravated by: (Movement aggravates left wrist pain.   Rt h

## 2018-12-03 NOTE — PROGRESS NOTES
Jumana Gonzalez PA-C   Em Rn Triage 5 hours ago (8:48 AM)         She can make an appointment if symptoms persist.         Documentation

## 2018-12-14 ENCOUNTER — NURSE TRIAGE (OUTPATIENT)
Dept: OTHER | Age: 23
End: 2018-12-14

## 2018-12-14 NOTE — TELEPHONE ENCOUNTER
LMTCB  Transfer to triage        Ronak Lindsay To  Em Rn Triage Sent  12/14/2018  2:35 PM   ----- Message from Generic, Mychart sent at 12/14/2018  2:35 PM CST -----     I had expressed concerns a few weeks back when i had weird bruising everywhere and i

## 2018-12-14 NOTE — TELEPHONE ENCOUNTER
Action Requested: Summary for Provider     []  Critical Lab, Recommendations Needed  [] Need Additional Advice  []   FYI    []   Need Orders  [] Need Medications Sent to Pharmacy  []  Other     SUMMARY: pt states she continues to have left wrist pain.  Has

## 2018-12-15 NOTE — TELEPHONE ENCOUNTER
Have her see me this week for wrist pain,/bruising. It will be a focused visit as we are working her in.

## 2018-12-18 ENCOUNTER — OFFICE VISIT (OUTPATIENT)
Dept: FAMILY MEDICINE CLINIC | Facility: CLINIC | Age: 23
End: 2018-12-18

## 2018-12-18 ENCOUNTER — APPOINTMENT (OUTPATIENT)
Dept: LAB | Age: 23
End: 2018-12-18
Attending: FAMILY MEDICINE
Payer: COMMERCIAL

## 2018-12-18 VITALS
SYSTOLIC BLOOD PRESSURE: 148 MMHG | HEART RATE: 81 BPM | DIASTOLIC BLOOD PRESSURE: 94 MMHG | HEIGHT: 66 IN | TEMPERATURE: 99 F | WEIGHT: 194 LBS | BODY MASS INDEX: 31.18 KG/M2

## 2018-12-18 DIAGNOSIS — M65.4 DE QUERVAIN'S TENOSYNOVITIS, LEFT: ICD-10-CM

## 2018-12-18 DIAGNOSIS — M25.532 LEFT WRIST PAIN: ICD-10-CM

## 2018-12-18 DIAGNOSIS — F41.9 ANXIETY: ICD-10-CM

## 2018-12-18 DIAGNOSIS — M65.4 DE QUERVAIN'S TENOSYNOVITIS, LEFT: Primary | ICD-10-CM

## 2018-12-18 PROCEDURE — 99212 OFFICE O/P EST SF 10 MIN: CPT | Performed by: FAMILY MEDICINE

## 2018-12-18 PROCEDURE — 86431 RHEUMATOID FACTOR QUANT: CPT

## 2018-12-18 PROCEDURE — 36415 COLL VENOUS BLD VENIPUNCTURE: CPT

## 2018-12-18 PROCEDURE — L3908 WHO COCK-UP NONMOLDE PRE OTS: HCPCS | Performed by: FAMILY MEDICINE

## 2018-12-18 PROCEDURE — 99214 OFFICE O/P EST MOD 30 MIN: CPT | Performed by: FAMILY MEDICINE

## 2018-12-18 RX ORDER — MELOXICAM 15 MG/1
15 TABLET ORAL DAILY
Qty: 30 TABLET | Refills: 0 | Status: SHIPPED | OUTPATIENT
Start: 2018-12-18 | End: 2019-01-17

## 2018-12-18 NOTE — TELEPHONE ENCOUNTER
Annia Harris   Em Rn Triage 20 minutes ago (8:12 AM)     Apt scheduled for 12/18    Routing Comment          No further action required. appt for today.     Future Appointments   Date Time Provider Naty Griggs   12/18/2018 10:20 AM Carin Beard

## 2018-12-18 NOTE — PROGRESS NOTES
Patient ID: Rashmi Vaughn is a 26year old female. HPI  Patient presents with:  Wrist Pain    On November 28, 2018 she was here to see a provider for her anxiety and she started having  pain that day on the left wrist.  She is right-hand dominant.   Catalina Singh Rfl:      Allergies:  Iodine (Topical)        RASH  Radiology Contrast *       PHYSICAL EXAM:   Physical Exam  Blood pressure (!) 148/94, pulse 81, temperature 98.6 °F (37 °C), temperature source Oral, height 5' 6\" (1.676 m), weight 194 lb (88 kg), last m medications. Referrals (if applicable)  No orders of the defined types were placed in this encounter. Follow up if symptoms persist.  Take medicine (if given) as prescribed.   Approach to treatment discussed and patient/family member understands

## 2019-01-25 ENCOUNTER — TELEPHONE (OUTPATIENT)
Dept: OTHER | Age: 24
End: 2019-01-25

## 2019-01-25 NOTE — TELEPHONE ENCOUNTER
Patient contacted, advised of recommendations per  [facility], patient agreed.  Follow up scheduled for Mon 1/28/19 with Dr Christina Alexander.

## 2019-01-25 NOTE — TELEPHONE ENCOUNTER
Spoke with patient--states she saw VS last month--took Meloxicam as prescribed and wore splint. Patient states, \"It started feeling better, so I stopped the medication and the brace. I didn't make a follow up appointment because I was feeling better. \" psychiatry every month but now is seeing more frequently after her panic attack in late November.   She should continue medications.        Referrals (if applicable)  No orders of the defined types were placed in this encounter.     Follow up if symptoms pe

## 2019-02-07 ENCOUNTER — NURSE TRIAGE (OUTPATIENT)
Dept: OTHER | Age: 24
End: 2019-02-07

## 2019-02-07 NOTE — TELEPHONE ENCOUNTER
Action Requested: Summary for Provider     []  Critical Lab, Recommendations Needed  [] Need Additional Advice  []   FYI    []   Need Orders  [] Need Medications Sent to Pharmacy  []  Other     SUMMARY: Per pt Dizziness started today.  Associate with Sofie Herring

## 2019-02-07 NOTE — TELEPHONE ENCOUNTER
Patient notified of MD's recommendation. Patient verbalized understanding. Patient has never had symptoms while on these meds for past 6 months. She will report to her psychiatrist; she will call back to schedule appt if needed.

## 2019-02-11 ENCOUNTER — OFFICE VISIT (OUTPATIENT)
Dept: FAMILY MEDICINE CLINIC | Facility: CLINIC | Age: 24
End: 2019-02-11

## 2019-02-11 ENCOUNTER — TELEPHONE (OUTPATIENT)
Dept: FAMILY MEDICINE CLINIC | Facility: CLINIC | Age: 24
End: 2019-02-11

## 2019-02-11 ENCOUNTER — HOSPITAL ENCOUNTER (OUTPATIENT)
Dept: GENERAL RADIOLOGY | Age: 24
Discharge: HOME | End: 2019-02-11
Attending: FAMILY MEDICINE
Payer: COMMERCIAL

## 2019-02-11 VITALS
WEIGHT: 197 LBS | HEIGHT: 66 IN | RESPIRATION RATE: 16 BRPM | BODY MASS INDEX: 31.66 KG/M2 | HEART RATE: 85 BPM | TEMPERATURE: 98 F | SYSTOLIC BLOOD PRESSURE: 131 MMHG | DIASTOLIC BLOOD PRESSURE: 84 MMHG

## 2019-02-11 DIAGNOSIS — M25.532 LEFT WRIST PAIN: ICD-10-CM

## 2019-02-11 DIAGNOSIS — R42 VERTIGO: Primary | ICD-10-CM

## 2019-02-11 DIAGNOSIS — R42 VERTIGO: ICD-10-CM

## 2019-02-11 DIAGNOSIS — F41.9 ANXIETY: ICD-10-CM

## 2019-02-11 PROCEDURE — 99212 OFFICE O/P EST SF 10 MIN: CPT | Performed by: FAMILY MEDICINE

## 2019-02-11 PROCEDURE — 99214 OFFICE O/P EST MOD 30 MIN: CPT | Performed by: FAMILY MEDICINE

## 2019-02-11 PROCEDURE — 73110 X-RAY EXAM OF WRIST: CPT | Performed by: FAMILY MEDICINE

## 2019-02-11 RX ORDER — MECLIZINE HYDROCHLORIDE 25 MG/1
25 TABLET ORAL 3 TIMES DAILY PRN
Refills: 0 | COMMUNITY
Start: 2019-02-08 | End: 2019-02-11

## 2019-02-11 RX ORDER — MECLIZINE HYDROCHLORIDE 25 MG/1
25 TABLET ORAL 3 TIMES DAILY PRN
Qty: 30 TABLET | Refills: 0 | Status: SHIPPED | OUTPATIENT
Start: 2019-02-11 | End: 2019-04-05

## 2019-02-11 NOTE — PROGRESS NOTES
Patient ID: Mike Bravo is a 26year old female. HPI  Patient presents with:  ER F/U: [facility] for vertigo  Wrist Pain: Follow up left wrist tendonitis    She went to [facility] emergency room on February 7, 2019.   She states the day prior nervous/anxious. Past Medical History:   Diagnosis Date   • Anxiety    • Anxiety state, unspecified    • Deliberate self-cutting    • Depression    • Pleurisy        History reviewed. No pertinent surgical history.        Current Outpatient Medicat present. Cardiovascular: Normal rate, regular rhythm and  normal heart sounds. No carotid bruits. Pulmonary/Chest: Effort normal and breath sounds normal. No respiratory distress. Lymphadenopathy:     Patient has no cervical adenopathy.    Neurologica

## 2019-02-16 RX ORDER — NORGESTIMATE AND ETHINYL ESTRADIOL 7DAYSX3 28
KIT ORAL
Qty: 84 TABLET | Refills: 0 | Status: SHIPPED | OUTPATIENT
Start: 2019-02-16 | End: 2019-05-21

## 2019-02-16 NOTE — TELEPHONE ENCOUNTER
Gynecology Medications  Protocol Criteria:  · Appointment scheduled in the past 12 months or the next 3 months  · Pap smear in the past 12 months  · Pap smear WNL manually verified  Recent Outpatient Visits            5 days ago 5 Josseline Fletcher

## 2019-03-28 ENCOUNTER — NURSE TRIAGE (OUTPATIENT)
Dept: OTHER | Age: 24
End: 2019-03-28

## 2019-03-28 NOTE — TELEPHONE ENCOUNTER
Action Requested: Summary for Provider     []  Critical Lab, Recommendations Needed  [x] Need Additional Advice  []   FYI    []   Need Orders  [] Need Medications Sent to Pharmacy  []  Other     SUMMARY: Dr. Dhillon Favors, please advise if patient can be added Fr

## 2019-04-04 NOTE — TELEPHONE ENCOUNTER
DR MEREDITH:  Patient called back regarding the dizziness. Her symptoms did not get worse, but still feels very drowsy and dizzy. She is capable of driving with no problem. Patient seeking to see you Friday.

## 2019-04-04 NOTE — TELEPHONE ENCOUNTER
Ok to add on, per Dr Bita Constantino via [facility].      Patient informed all will come in for focus visit, 11:10am.     CSS: please add patient to 11:10am slot 4/5/19 with DR MEREDITH.

## 2019-04-05 ENCOUNTER — OFFICE VISIT (OUTPATIENT)
Dept: FAMILY MEDICINE CLINIC | Facility: CLINIC | Age: 24
End: 2019-04-05

## 2019-04-05 ENCOUNTER — LAB ENCOUNTER (OUTPATIENT)
Dept: LAB | Age: 24
End: 2019-04-05
Attending: FAMILY MEDICINE
Payer: COMMERCIAL

## 2019-04-05 VITALS
HEIGHT: 66 IN | SYSTOLIC BLOOD PRESSURE: 139 MMHG | DIASTOLIC BLOOD PRESSURE: 91 MMHG | TEMPERATURE: 97 F | BODY MASS INDEX: 31.66 KG/M2 | HEART RATE: 90 BPM | WEIGHT: 197 LBS

## 2019-04-05 DIAGNOSIS — R29.818 ROMBERG'S TEST POSITIVE: ICD-10-CM

## 2019-04-05 DIAGNOSIS — R26.89 IMBALANCE: ICD-10-CM

## 2019-04-05 DIAGNOSIS — R26.89 IMBALANCE: Primary | ICD-10-CM

## 2019-04-05 DIAGNOSIS — R26.81 GENERAL UNSTEADINESS: ICD-10-CM

## 2019-04-05 DIAGNOSIS — F41.0 PANIC ATTACKS: ICD-10-CM

## 2019-04-05 DIAGNOSIS — F41.9 ANXIETY: ICD-10-CM

## 2019-04-05 PROCEDURE — 99212 OFFICE O/P EST SF 10 MIN: CPT | Performed by: FAMILY MEDICINE

## 2019-04-05 PROCEDURE — 36415 COLL VENOUS BLD VENIPUNCTURE: CPT

## 2019-04-05 PROCEDURE — 99214 OFFICE O/P EST MOD 30 MIN: CPT | Performed by: FAMILY MEDICINE

## 2019-04-05 PROCEDURE — 80048 BASIC METABOLIC PNL TOTAL CA: CPT

## 2019-04-05 PROCEDURE — 85025 COMPLETE CBC W/AUTO DIFF WBC: CPT

## 2019-04-05 RX ORDER — GABAPENTIN 300 MG/1
1 CAPSULE ORAL 2 TIMES DAILY
Refills: 1 | COMMUNITY
Start: 2019-03-13 | End: 2020-04-21

## 2019-04-05 NOTE — PROGRESS NOTES
Patient ID: Hussein Chi is a 26year old female. 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 Admitted to [facility] on 2/7/2019 feeling not well and dizzy all day. Diagnosed with vertigo.  E 3.2 oz (86.3 kg)  04/10/18 : 195 lb 9.6 oz (88.7 kg)      BMI Readings from Last 6 Encounters:  04/05/19 : 31.80 kg/m²  02/11/19 : 31.80 kg/m²  12/18/18 : 31.31 kg/m²  11/28/18 : 31.15 kg/m²  10/13/18 : 30.70 kg/m²  04/10/18 : 31.57 kg/m²      BP Readings (36.2 °C), temperature source Oral, height 5' 6\" (1.676 m), weight 197 lb (89.4 kg), last menstrual period 03/25/2019, not currently breastfeeding. Physical Exam   Constitutional: Patient is oriented to person, place, and time.  Patient appears well-devel psychiatry  Panic attacks  As above      Referrals (if applicable)  No orders of the defined types were placed in this encounter. Follow up if symptoms persist.  Take medicine (if given) as prescribed.   Approach to treatment discussed and patient/fa

## 2019-04-08 ENCOUNTER — NURSE TRIAGE (OUTPATIENT)
Dept: OTHER | Age: 24
End: 2019-04-08

## 2019-04-08 NOTE — TELEPHONE ENCOUNTER
Action Requested: Summary for Provider     []  Critical Lab, Recommendations Needed  [x] Need Additional Advice  []   FYI    []   Need Orders  [] Need Medications Sent to Pharmacy  []  Other     SUMMARY: Per protocol call back within 1 hour       Reason fo

## 2019-04-09 NOTE — TELEPHONE ENCOUNTER
Pt contacted and notified of recommendation. Reports she will go [facility].   Will call tomorrow to see how pt is doing s/p ER Visit.

## 2019-04-09 NOTE — TELEPHONE ENCOUNTER
pt called again and she stated that she did go to a ER near her house but it was small and crowded she left and went home and she slept.  Today  Pt stated that her headache has gotten better then yesterday but its still a 7/10 on/off and dizziness sti

## 2019-04-09 NOTE — TELEPHONE ENCOUNTER
She is talking about the MRI and not a CAT scan correct? I think she can wait till Friday considering that she feels better now but she can also call the scheduling department and see if there is any cancellations and she can get in sooner.

## 2019-04-09 NOTE — TELEPHONE ENCOUNTER
Yes Katty Tuttle your correct she meant the MRI. She will try to get in sooner and call then later. Pt was advised again if the severe headache comes back she is to go to ER.

## 2019-04-12 ENCOUNTER — HOSPITAL ENCOUNTER (OUTPATIENT)
Dept: MRI IMAGING | Facility: HOSPITAL | Age: 24
Discharge: HOME | End: 2019-04-12
Attending: FAMILY MEDICINE
Payer: COMMERCIAL

## 2019-04-12 DIAGNOSIS — R26.89 IMBALANCE: ICD-10-CM

## 2019-04-12 DIAGNOSIS — R29.818 ROMBERG'S TEST POSITIVE: ICD-10-CM

## 2019-04-12 DIAGNOSIS — R26.81 GENERAL UNSTEADINESS: ICD-10-CM

## 2019-04-12 PROCEDURE — 70551 MRI BRAIN STEM W/O DYE: CPT | Performed by: FAMILY MEDICINE

## 2019-04-15 ENCOUNTER — TELEPHONE (OUTPATIENT)
Dept: FAMILY MEDICINE CLINIC | Facility: CLINIC | Age: 24
End: 2019-04-15

## 2019-04-15 RX ORDER — TOPIRAMATE 25 MG/1
25 TABLET ORAL 2 TIMES DAILY
Qty: 60 TABLET | Refills: 1 | Status: SHIPPED | OUTPATIENT
Start: 2019-04-15 | End: 2019-05-06

## 2019-04-15 NOTE — TELEPHONE ENCOUNTER
Please advise on what else she can take? She has been having at least 2 times a day 8.5/10 headaches which last around 4 to 5 hours with dizziness and a little bit of nausea. I suggested a cold washcloth or essential oils with understanding.

## 2019-04-15 NOTE — TELEPHONE ENCOUNTER
Pt reports she made appointment with neurologist and was scheduled on May 6 . Pt states she is constantly having head pain daily and has been taking Aleve, Aspirin with no improvement. Dr Gi Alonzo.  Pt is asking if she can take or prescribe other pain medi

## 2019-04-18 ENCOUNTER — NURSE TRIAGE (OUTPATIENT)
Dept: OTHER | Age: 24
End: 2019-04-18

## 2019-04-18 ENCOUNTER — OFFICE VISIT (OUTPATIENT)
Dept: FAMILY MEDICINE CLINIC | Facility: CLINIC | Age: 24
End: 2019-04-18

## 2019-04-18 ENCOUNTER — HOSPITAL ENCOUNTER (EMERGENCY)
Facility: HOSPITAL | Age: 24
Discharge: HOME | End: 2019-04-18
Payer: COMMERCIAL

## 2019-04-18 VITALS
SYSTOLIC BLOOD PRESSURE: 135 MMHG | TEMPERATURE: 98 F | HEART RATE: 89 BPM | BODY MASS INDEX: 31.5 KG/M2 | WEIGHT: 196 LBS | RESPIRATION RATE: 18 BRPM | HEIGHT: 66 IN | OXYGEN SATURATION: 99 % | DIASTOLIC BLOOD PRESSURE: 67 MMHG

## 2019-04-18 VITALS
DIASTOLIC BLOOD PRESSURE: 84 MMHG | BODY MASS INDEX: 32 KG/M2 | HEART RATE: 89 BPM | TEMPERATURE: 99 F | HEIGHT: 66 IN | SYSTOLIC BLOOD PRESSURE: 146 MMHG

## 2019-04-18 DIAGNOSIS — R26.89 IMBALANCE: Primary | ICD-10-CM

## 2019-04-18 DIAGNOSIS — G43.909 MIGRAINE WITHOUT STATUS MIGRAINOSUS, NOT INTRACTABLE, UNSPECIFIED MIGRAINE TYPE: Primary | ICD-10-CM

## 2019-04-18 PROCEDURE — 85025 COMPLETE CBC W/AUTO DIFF WBC: CPT | Performed by: NURSE PRACTITIONER

## 2019-04-18 PROCEDURE — 96374 THER/PROPH/DIAG INJ IV PUSH: CPT

## 2019-04-18 PROCEDURE — 99284 EMERGENCY DEPT VISIT MOD MDM: CPT

## 2019-04-18 PROCEDURE — 99213 OFFICE O/P EST LOW 20 MIN: CPT | Performed by: PHYSICIAN ASSISTANT

## 2019-04-18 PROCEDURE — 99212 OFFICE O/P EST SF 10 MIN: CPT | Performed by: PHYSICIAN ASSISTANT

## 2019-04-18 PROCEDURE — 96361 HYDRATE IV INFUSION ADD-ON: CPT

## 2019-04-18 PROCEDURE — 81025 URINE PREGNANCY TEST: CPT

## 2019-04-18 PROCEDURE — 80048 BASIC METABOLIC PNL TOTAL CA: CPT | Performed by: NURSE PRACTITIONER

## 2019-04-18 PROCEDURE — 96375 TX/PRO/DX INJ NEW DRUG ADDON: CPT

## 2019-04-18 RX ORDER — METOCLOPRAMIDE HYDROCHLORIDE 5 MG/ML
10 INJECTION INTRAMUSCULAR; INTRAVENOUS ONCE
Status: COMPLETED | OUTPATIENT
Start: 2019-04-18 | End: 2019-04-18

## 2019-04-18 RX ORDER — KETOROLAC TROMETHAMINE 30 MG/ML
30 INJECTION, SOLUTION INTRAMUSCULAR; INTRAVENOUS ONCE
Status: COMPLETED | OUTPATIENT
Start: 2019-04-18 | End: 2019-04-18

## 2019-04-18 RX ORDER — DIPHENHYDRAMINE HYDROCHLORIDE 50 MG/ML
25 INJECTION INTRAMUSCULAR; INTRAVENOUS ONCE
Status: COMPLETED | OUTPATIENT
Start: 2019-04-18 | End: 2019-04-18

## 2019-04-18 NOTE — ED INITIAL ASSESSMENT (HPI)
Pt to ER with c/o worsening headache. Pt seen at PCP office today. Pt c/o dizziness. Pt denies fall or head injury. MRI brain done on 4-12-19 here. Pt states Neuro appt 5-6-19.

## 2019-04-18 NOTE — TELEPHONE ENCOUNTER
Action Requested: Summary for Provider     []  Critical Lab, Recommendations Needed  [] Need Additional Advice  [x]   FYI    []   Need Orders  [] Need Medications Sent to Pharmacy  []  Other     SUMMARY: patient seen recently 4/5/19 by Ya Biswas for [facility]

## 2019-04-18 NOTE — PROGRESS NOTES
Tried calling [facility] Neurology office to get pt in sooner. Unfortunately specialists office is closed . AISXTMDP phone go on at 7:00AM-3:00PM. Pt was notified.

## 2019-04-18 NOTE — TELEPHONE ENCOUNTER
Spoke with patient. Patient stated she was informed to call back for condition update. Patient states she still has a headache. Appointment was scheduled for today, 4-18-19 at 3:30 pm with Theresa in [place].  Patient verbalized understanding and accepted appo

## 2019-04-19 NOTE — ASSESSMENT & PLAN NOTE
Advise patient to take Topamax 25 mg PO QD and follow up with Neurologist. Advise patient to proceed to ER if worsen. Patient verbalized understanding. Discussed MRI of brain result with patient.

## 2019-04-19 NOTE — ED PROVIDER NOTES
Patient Seen in: [facility] AND [facility] Emergency Department    History   CC: headache  HPI: Carmeloharoon Fierrouton 26year old female  who presents to the ER c/o intermittent headaches that occur 2-3x/day x2mo. no known exacerbating or relieving factors.   States hea Smokeless tobacco: Never Used    Alcohol use: No    Drug use: No      ROS:  Review of Systems    Positive for stated complaint: headache  Other systems are as noted in HPI. Constitutional and vital signs reviewed.       All other systems reviewed and negat warm and dry throughout, mmm, no obvious signs of swelling/trauma/deformity, cap refill <2seconds  Neuro - A&O x4, proper coordination noted with finger-to-nose tests, steady gait. +rhomburg   MSK - makes purposeful movements of all extremities with full R encounter diagnosis)    Disposition:  Discharge    Follow-up:  DO Kindra Gallegos [facility] [id number]  [phone]    Schedule an appointment as soon as possible for a visit in 2 days      Velma Marks MD  [address]

## 2019-04-30 ENCOUNTER — NURSE TRIAGE (OUTPATIENT)
Dept: FAMILY MEDICINE CLINIC | Facility: CLINIC | Age: 24
End: 2019-04-30

## 2019-04-30 NOTE — TELEPHONE ENCOUNTER
Action Requested: Summary for Provider     []  Critical Lab, Recommendations Needed  [] Need Additional Advice  []   FYI    []   Need Orders  [] Need Medications Sent to Pharmacy  []  Other     SUMMARY: Per protocol advised OV now and pt scheduled for 4:30

## 2019-05-06 ENCOUNTER — OFFICE VISIT (OUTPATIENT)
Dept: NEUROLOGY | Facility: CLINIC | Age: 24
End: 2019-05-06

## 2019-05-06 VITALS
BODY MASS INDEX: 31.02 KG/M2 | HEIGHT: 66 IN | WEIGHT: 193 LBS | HEART RATE: 88 BPM | SYSTOLIC BLOOD PRESSURE: 132 MMHG | DIASTOLIC BLOOD PRESSURE: 90 MMHG

## 2019-05-06 DIAGNOSIS — G43.109 MIGRAINE WITH VERTIGO: Primary | ICD-10-CM

## 2019-05-06 PROCEDURE — 99204 OFFICE O/P NEW MOD 45 MIN: CPT | Performed by: OTHER

## 2019-05-06 RX ORDER — AMITRIPTYLINE HYDROCHLORIDE 25 MG/1
TABLET, FILM COATED ORAL
Qty: 90 TABLET | Refills: 3 | Status: SHIPPED | OUTPATIENT
Start: 2019-05-06 | End: 2020-01-03

## 2019-05-06 RX ORDER — METOCLOPRAMIDE 10 MG/1
10 TABLET ORAL
Qty: 30 TABLET | Refills: 2 | Status: SHIPPED | OUTPATIENT
Start: 2019-05-06 | End: 2019-06-21

## 2019-05-06 RX ORDER — SUMATRIPTAN 100 MG/1
100 TABLET, FILM COATED ORAL EVERY 2 HOUR PRN
Qty: 9 TABLET | Refills: 5 | Status: SHIPPED | OUTPATIENT
Start: 2019-05-06 | End: 2019-06-05

## 2019-05-06 NOTE — PROGRESS NOTES
Neurology Initial Visit     Referred By: Dr. Yudelka Chau    Chief Complaint: Patient presents with:  Er F/u: Patient presents today stating that was in ER on 4/18/19 for headaches and dizziness.  She states that in february she started to experience headaches • Heart Disease Maternal Grandfather          Current Outpatient Medications:   •  Amitriptyline HCl 25 MG Oral Tab, Start with 1 pill QHS, for 1 week, then 2 pills qhs foor another week, then 3 pills qhs, Disp: 90 tablet, Rfl: 3  •  SUMAtriptan Succinat facial weakness  VIII. Hearing intact to whisper, tuning fork or finger rub. IX. Pallet elevates symmetrically. XI. Shoulder shrug is intact  XII.  Tongue is midline    Motor Exam:  Muscle tone normal  No atrophy or fasciculations  Strength- upper extremi to call my office or seek medical attention immediately if symptoms worsen. Patient verbalized understanding of information given. All questions were answered. All side effects of drugs were discussed.      Return to clinic in: Return in about 1 month (sheryl

## 2019-05-21 DIAGNOSIS — Z30.09 COUNSELING FOR BIRTH CONTROL, ORAL CONTRACEPTIVES: Primary | ICD-10-CM

## 2019-05-21 RX ORDER — NORGESTIMATE AND ETHINYL ESTRADIOL 7DAYSX3 28
KIT ORAL
Qty: 84 TABLET | Refills: 0 | Status: SHIPPED | OUTPATIENT
Start: 2019-05-21 | End: 2019-08-21

## 2019-05-22 NOTE — TELEPHONE ENCOUNTER
Refill times 1 but needs to see gynecology. I gave her a referral in the past.  I will go ahead and do a referral again to Dr. Justina Dimas who is at the [address] [place] [address] office.

## 2019-05-25 NOTE — TELEPHONE ENCOUNTER
Pt returned call and was given message below with OBGYN scheduling number. She expressed understanding.

## 2019-06-17 ENCOUNTER — NURSE TRIAGE (OUTPATIENT)
Dept: FAMILY MEDICINE CLINIC | Facility: CLINIC | Age: 24
End: 2019-06-17

## 2019-06-17 NOTE — TELEPHONE ENCOUNTER
Action Requested: Summary for Provider     []  Critical Lab, Recommendations Needed  [x] Need Additional Advice  []   FYI    []   Need Orders  [] Need Medications Sent to Pharmacy  []  Other     SUMMARY: Pt states last couple days she has had little appe

## 2019-06-18 NOTE — TELEPHONE ENCOUNTER
LMTCB. CSS, upon call back please schedule pt with KAK or any available provider today. If any issues, please transfer to Triage.  (Also sent MyChart message)

## 2019-06-21 ENCOUNTER — LAB ENCOUNTER (OUTPATIENT)
Dept: LAB | Age: 24
End: 2019-06-21
Attending: FAMILY MEDICINE
Payer: COMMERCIAL

## 2019-06-21 ENCOUNTER — OFFICE VISIT (OUTPATIENT)
Dept: FAMILY MEDICINE CLINIC | Facility: CLINIC | Age: 24
End: 2019-06-21

## 2019-06-21 VITALS
HEIGHT: 66 IN | HEART RATE: 89 BPM | TEMPERATURE: 99 F | BODY MASS INDEX: 31.02 KG/M2 | DIASTOLIC BLOOD PRESSURE: 97 MMHG | SYSTOLIC BLOOD PRESSURE: 144 MMHG | WEIGHT: 193 LBS

## 2019-06-21 DIAGNOSIS — K92.1 BLOOD IN STOOL: ICD-10-CM

## 2019-06-21 DIAGNOSIS — R10.33 PERIUMBILICAL ABDOMINAL PAIN: Primary | ICD-10-CM

## 2019-06-21 DIAGNOSIS — R10.33 PERIUMBILICAL ABDOMINAL PAIN: ICD-10-CM

## 2019-06-21 DIAGNOSIS — K29.00 ACUTE GASTRITIS WITHOUT HEMORRHAGE, UNSPECIFIED GASTRITIS TYPE: ICD-10-CM

## 2019-06-21 DIAGNOSIS — R11.0 NAUSEA: ICD-10-CM

## 2019-06-21 DIAGNOSIS — R53.1 WEAKNESS: ICD-10-CM

## 2019-06-21 PROCEDURE — 85025 COMPLETE CBC W/AUTO DIFF WBC: CPT

## 2019-06-21 PROCEDURE — 36415 COLL VENOUS BLD VENIPUNCTURE: CPT

## 2019-06-21 PROCEDURE — 99214 OFFICE O/P EST MOD 30 MIN: CPT | Performed by: FAMILY MEDICINE

## 2019-06-21 PROCEDURE — 80053 COMPREHEN METABOLIC PANEL: CPT

## 2019-06-21 PROCEDURE — 83013 H PYLORI (C-13) BREATH: CPT

## 2019-06-21 PROCEDURE — 99212 OFFICE O/P EST SF 10 MIN: CPT | Performed by: FAMILY MEDICINE

## 2019-06-21 RX ORDER — PANTOPRAZOLE SODIUM 40 MG/1
40 TABLET, DELAYED RELEASE ORAL
Qty: 90 TABLET | Refills: 0 | Status: SHIPPED | OUTPATIENT
Start: 2019-06-21 | End: 2019-10-25

## 2019-07-16 NOTE — H&P
[address] [facility] [address] Gastroenterology                                                                                                  Clinic History and Physical     Pa esophageal, gastric or colon cancer  - No family history of IBD.     Prior endoscopies:  Denies    Social Hx:  - No smoking/etoh  - Denies illicit drug use   - LMP: 7/22/2019  - Occupation: Reception desk at a [employer]  - Lives with family  - NSAIDs/ASA u HPI  GENITOURINARY:  negative for dysuria or gross hematuria  INTEGUMENT/BREAST:  SKIN:  negative for jaundice   ALLERGIC/IMMUNOLOGIC:  negative for hay fever  ENDOCRINE:  negative for cold intolerance and heat intolerance  MUSCULOSKELETAL:  negative for j in the toilet bowl. I suspect this could be related to local anorectal irritation or hemorrhoids versus more significant GI pathology. With no significant GI family history, carcinoma would be unlikely.   IBD would also be less likely given the patient's

## 2019-07-23 ENCOUNTER — OFFICE VISIT (OUTPATIENT)
Dept: GASTROENTEROLOGY | Facility: CLINIC | Age: 24
End: 2019-07-23

## 2019-07-23 VITALS
HEIGHT: 65 IN | HEART RATE: 87 BPM | WEIGHT: 195 LBS | SYSTOLIC BLOOD PRESSURE: 133 MMHG | DIASTOLIC BLOOD PRESSURE: 90 MMHG | BODY MASS INDEX: 32.49 KG/M2

## 2019-07-23 DIAGNOSIS — R10.13 EPIGASTRIC PAIN: ICD-10-CM

## 2019-07-23 DIAGNOSIS — K62.5 RECTAL BLEEDING: ICD-10-CM

## 2019-07-23 DIAGNOSIS — R19.4 CHANGE IN BOWEL HABITS: Primary | ICD-10-CM

## 2019-07-23 DIAGNOSIS — R14.0 ABDOMINAL BLOATING: ICD-10-CM

## 2019-07-23 PROCEDURE — 99243 OFF/OP CNSLTJ NEW/EST LOW 30: CPT | Performed by: NURSE PRACTITIONER

## 2019-08-15 ENCOUNTER — NURSE TRIAGE (OUTPATIENT)
Dept: OTHER | Age: 24
End: 2019-08-15

## 2019-08-15 NOTE — TELEPHONE ENCOUNTER
Patient saw by Dr. Nahum Hernández 1/75/29 for Periumbilical abdominal  Pain. Given Pantoprazole Sodium to take daily. She also saw MORALES Oh On 7/23/19 and was advised to  Follow-up in 6-8 weeks or sooner if new issues arise.  She stated that she ha

## 2019-08-16 ENCOUNTER — LAB ENCOUNTER (OUTPATIENT)
Dept: LAB | Age: 24
End: 2019-08-16
Attending: FAMILY MEDICINE
Payer: COMMERCIAL

## 2019-08-16 ENCOUNTER — OFFICE VISIT (OUTPATIENT)
Dept: FAMILY MEDICINE CLINIC | Facility: CLINIC | Age: 24
End: 2019-08-16

## 2019-08-16 VITALS
WEIGHT: 195 LBS | HEIGHT: 65 IN | HEART RATE: 84 BPM | DIASTOLIC BLOOD PRESSURE: 93 MMHG | SYSTOLIC BLOOD PRESSURE: 137 MMHG | TEMPERATURE: 98 F | BODY MASS INDEX: 32.49 KG/M2

## 2019-08-16 DIAGNOSIS — R19.4 ALTERED BOWEL HABITS: ICD-10-CM

## 2019-08-16 DIAGNOSIS — R19.7 DIARRHEA, UNSPECIFIED TYPE: Primary | ICD-10-CM

## 2019-08-16 DIAGNOSIS — F41.9 ANXIETY: ICD-10-CM

## 2019-08-16 DIAGNOSIS — R10.32 LLQ ABDOMINAL PAIN: ICD-10-CM

## 2019-08-16 DIAGNOSIS — R19.7 DIARRHEA, UNSPECIFIED TYPE: ICD-10-CM

## 2019-08-16 DIAGNOSIS — R10.2 SUPRAPUBIC ABDOMINAL PAIN: ICD-10-CM

## 2019-08-16 DIAGNOSIS — R63.0 DECREASED APPETITE: ICD-10-CM

## 2019-08-16 LAB
BILIRUB UR QL: NEGATIVE
COLOR UR: YELLOW
GLUCOSE UR-MCNC: NEGATIVE MG/DL
KETONES UR-MCNC: NEGATIVE MG/DL
NITRITE UR QL STRIP.AUTO: NEGATIVE
PH UR: 6 [PH] (ref 5–8)
PROT UR-MCNC: NEGATIVE MG/DL
RBC #/AREA URNS AUTO: 4 /HPF
SP GR UR STRIP: 1.02 (ref 1–1.03)
UROBILINOGEN UR STRIP-ACNC: <2
VIT C UR-MCNC: NEGATIVE MG/DL
WBC #/AREA URNS AUTO: 4 /HPF

## 2019-08-16 PROCEDURE — 99214 OFFICE O/P EST MOD 30 MIN: CPT | Performed by: FAMILY MEDICINE

## 2019-08-16 PROCEDURE — 81001 URINALYSIS AUTO W/SCOPE: CPT

## 2019-08-16 PROCEDURE — 87086 URINE CULTURE/COLONY COUNT: CPT | Performed by: FAMILY MEDICINE

## 2019-08-16 NOTE — PROGRESS NOTES
Patient ID: Asia Barrow is a 22year old female. HPI  Patient presents with:  Abdominal Pain: follow up     Pt has an appointment with GI on 8/27/19. Has an appointment with gynecology on 9/3/19.     States she has been having diarrhea multiple times Positive for abdominal pain and diarrhea. Negative for abdominal distention and constipation. Skin: Negative for color change. Neurological: Negative for speech difficulty. Psychiatric/Behavioral: The patient is nervous/anxious.           Past Medical rigidity, no rebound, no guarding. No epigastric pain. Negative Galdamez's sign. Mild suprapubic and LLQ pain. Negative CVA tenderness. Neurological: Patient is alert and oriented to person, place, and time. Skin: Skin is warm.    Psychiatry: Mildly anxio

## 2019-08-16 NOTE — TELEPHONE ENCOUNTER
Agree with triage plan but if she wants to be seen tomorrow before noon that is fine otherwise I can see her next week.

## 2019-08-16 NOTE — TELEPHONE ENCOUNTER
Patient contacted. She was given Dr. Tiffanie Ravi message below. She agreed to come in Friday morning.      ADD on at 9am.  (ok per Dr. Pineda Shelby to add on before noon Friday)

## 2019-08-17 ENCOUNTER — TELEPHONE (OUTPATIENT)
Dept: OTHER | Age: 24
End: 2019-08-17

## 2019-08-17 DIAGNOSIS — R10.31 RIGHT LOWER QUADRANT ABDOMINAL PAIN: Primary | ICD-10-CM

## 2019-08-23 RX ORDER — NORGESTIMATE AND ETHINYL ESTRADIOL 7DAYSX3 28
1 KIT ORAL
Qty: 84 TABLET | Refills: 1 | Status: SHIPPED | OUTPATIENT
Start: 2019-08-23 | End: 2020-02-12

## 2019-08-23 NOTE — TELEPHONE ENCOUNTER
Please review; protocol failed.   Gynecology Medications  Protocol Criteria:  · Appointment scheduled in the past 12 months or the next 3 months  · Pap smear in the past 12 months  · Pap smear WNL manually verified  Recent Outpatient Visits            6 day

## 2019-08-27 ENCOUNTER — OFFICE VISIT (OUTPATIENT)
Dept: GASTROENTEROLOGY | Facility: CLINIC | Age: 24
End: 2019-08-27

## 2019-08-27 ENCOUNTER — APPOINTMENT (OUTPATIENT)
Dept: LAB | Age: 24
End: 2019-08-27
Attending: NURSE PRACTITIONER
Payer: COMMERCIAL

## 2019-08-27 ENCOUNTER — APPOINTMENT (OUTPATIENT)
Dept: LAB | Facility: HOSPITAL | Age: 24
End: 2019-08-27
Attending: NURSE PRACTITIONER
Payer: COMMERCIAL

## 2019-08-27 VITALS
HEART RATE: 90 BPM | SYSTOLIC BLOOD PRESSURE: 134 MMHG | BODY MASS INDEX: 32.15 KG/M2 | WEIGHT: 193 LBS | HEIGHT: 65 IN | DIASTOLIC BLOOD PRESSURE: 90 MMHG

## 2019-08-27 DIAGNOSIS — R19.4 ALTERED BOWEL HABITS: ICD-10-CM

## 2019-08-27 DIAGNOSIS — R19.4 ALTERED BOWEL HABITS: Primary | ICD-10-CM

## 2019-08-27 DIAGNOSIS — K62.5 RECTAL BLEEDING: ICD-10-CM

## 2019-08-27 DIAGNOSIS — R10.30 LOWER ABDOMINAL PAIN: ICD-10-CM

## 2019-08-27 LAB
IGA SERPL-MCNC: 179 MG/DL (ref 70–312)
TSI SER-ACNC: 1.34 MIU/ML (ref 0.36–3.74)

## 2019-08-27 PROCEDURE — 99214 OFFICE O/P EST MOD 30 MIN: CPT | Performed by: NURSE PRACTITIONER

## 2019-08-27 PROCEDURE — 36415 COLL VENOUS BLD VENIPUNCTURE: CPT | Performed by: NURSE PRACTITIONER

## 2019-08-27 PROCEDURE — 82784 ASSAY IGA/IGD/IGG/IGM EACH: CPT | Performed by: NURSE PRACTITIONER

## 2019-08-27 PROCEDURE — 87272 CRYPTOSPORIDIUM AG IF: CPT

## 2019-08-27 PROCEDURE — 84443 ASSAY THYROID STIM HORMONE: CPT

## 2019-08-27 PROCEDURE — 87329 GIARDIA AG IA: CPT

## 2019-08-27 PROCEDURE — 87045 FECES CULTURE AEROBIC BACT: CPT

## 2019-08-27 PROCEDURE — 87427 SHIGA-LIKE TOXIN AG IA: CPT

## 2019-08-27 PROCEDURE — 83516 IMMUNOASSAY NONANTIBODY: CPT

## 2019-08-27 PROCEDURE — 87046 STOOL CULTR AEROBIC BACT EA: CPT

## 2019-08-27 NOTE — PATIENT INSTRUCTIONS
1.  Complete stool testing and blood work and I will let you know with results on my chart  2. If the lab work is negative, then I will have 1 of our scheduling staff give you a call to schedule the colonoscopy  3.   We will plan to follow-up again after t

## 2019-08-27 NOTE — PROGRESS NOTES
[address] [facility] Follow-up Visit    Lazara trigger. She describes episodic diarrheal stools up to 6 times daily accompanied by lower abdominal pain that may self resolve and recur days later. This alternates constipation.       She reports one episode of bright red blood in the toilet bowl which a Disp: 90 tablet Rfl: 0   Amitriptyline HCl 25 MG Oral Tab Start with 1 pill QHS, for 1 week, then 2 pills qhs foor another week, then 3 pills qhs Disp: 90 tablet Rfl: 3   gabapentin 300 MG Oral Cap Take 1 capsule by mouth 2 (two) times daily.  Disp:  Rfl: 1 and imaging were reviewed and discussed with patient today. See HPI and A&P for further details.      .  ASSESSMENT/PLAN:   Valentin Buckner a 22year old year-old female pt of Dr. Johnny Reis history of anxiety, deliberate self cutting, depression, who p procedure(s)   - NO herbal supplements or weight loss medications x 7 days prior to the procedure(s)    ** If MAC @ [facility] or IV twilight - continue all medications as prescribed        Colonoscopy consent: I have discussed the risks (including risk of delaye

## 2019-08-28 ENCOUNTER — TELEPHONE (OUTPATIENT)
Dept: FAMILY MEDICINE CLINIC | Facility: CLINIC | Age: 24
End: 2019-08-28

## 2019-08-28 LAB
CRYPTOSP AG STL QL IA: NEGATIVE
G LAMBLIA AG STL QL IA: NEGATIVE
TTG IGA SER-ACNC: 0.3 U/ML (ref ?–7)

## 2019-08-28 NOTE — TELEPHONE ENCOUNTER
Nilo Negro called from the reference lab, patient dropped off  a formed stool. She is cancelling the C diff test as they do not run that on formed stool  . Other stool tests will be done as ordered.

## 2019-08-31 ENCOUNTER — PATIENT MESSAGE (OUTPATIENT)
Dept: GASTROENTEROLOGY | Facility: CLINIC | Age: 24
End: 2019-08-31

## 2019-09-03 ENCOUNTER — TELEPHONE (OUTPATIENT)
Dept: GASTROENTEROLOGY | Facility: CLINIC | Age: 24
End: 2019-09-03

## 2019-09-03 ENCOUNTER — OFFICE VISIT (OUTPATIENT)
Dept: OBGYN CLINIC | Facility: CLINIC | Age: 24
End: 2019-09-03

## 2019-09-03 VITALS
BODY MASS INDEX: 31.89 KG/M2 | SYSTOLIC BLOOD PRESSURE: 130 MMHG | DIASTOLIC BLOOD PRESSURE: 90 MMHG | WEIGHT: 191.38 LBS | HEIGHT: 65 IN | HEART RATE: 88 BPM

## 2019-09-03 DIAGNOSIS — Z30.41 ENCOUNTER FOR BIRTH CONTROL PILLS MAINTENANCE: ICD-10-CM

## 2019-09-03 DIAGNOSIS — Z01.419 ENCOUNTER FOR GYNECOLOGICAL EXAMINATION WITHOUT ABNORMAL FINDING: Primary | ICD-10-CM

## 2019-09-03 DIAGNOSIS — Z12.4 CERVICAL CANCER SCREENING: ICD-10-CM

## 2019-09-03 PROCEDURE — 99385 PREV VISIT NEW AGE 18-39: CPT | Performed by: OBSTETRICS & GYNECOLOGY

## 2019-09-03 NOTE — TELEPHONE ENCOUNTER
From: Norma Vela  To: LANCE Jennings  Sent: 8/31/2019 4:15 PM CDT  Subject: Non-Urgent Medical Question    Hello,  I noticed the stool cultures are back and they came back negative.  I was not able to hold food in my stomach these past 2 days and

## 2019-09-03 NOTE — PROGRESS NOTES
Well Woman Exam    HPI:  The patient is a 22yo female who presents for annual exam. She is on OCPs and happy with them. BP elevated today will need repeat and likely due to anxiety.  Denies migrianes, HTN and VTE in the past.     Reviewed medical and surgic partner: Not on file        Emotionally abused: Not on file        Physically abused: Not on file        Forced sexual activity: Not on file    Other Topics      Concerns:         Service: Not Asked        Blood Transfusions: Not Asked        Caffe breath  Gastrointestinal:  denies nausea, vomiting, diarrhea and constipation and severe abdominal pain  Genitourinary:  denies dysuria, incontinence  Heme: Denies easy bruising   Skin/Breast:  Denies any breast pain, lumps, or discharge.    Neurological: awareness   4.  Follow up in 1 year

## 2019-09-03 NOTE — TELEPHONE ENCOUNTER
Please advise on message below from patient.     Per office visit notes from Porfirio on 08/27/19-    2.  Irregular bowel habits/rectal bleeding/lower abdominal pain: The patient's lower GI symptoms, which have previously resolved, have not reoccurred

## 2019-09-03 NOTE — TELEPHONE ENCOUNTER
Patient contacted and message from Lilian Fields given. Patient  voiced understanding. Patient states she will start with this recommendation and then follow up in the office or call back with more questions.

## 2019-09-03 NOTE — TELEPHONE ENCOUNTER
Pt calling to inform [facility] she would like to start treatment for IBS before scheduling CLN.  Please call [phone]

## 2019-09-03 NOTE — TELEPHONE ENCOUNTER
I resulted to the patient's stool test via my chart this morning.     If the patient would like treatment for IBS-like symptoms, consider probiotics, fiber supplements, MiraLAX/stool softeners as needed for constipation, avoidance of dairy/gluten products,

## 2019-10-25 ENCOUNTER — APPOINTMENT (OUTPATIENT)
Dept: MRI IMAGING | Facility: HOSPITAL | Age: 24
End: 2019-10-25
Attending: EMERGENCY MEDICINE
Payer: COMMERCIAL

## 2019-10-25 ENCOUNTER — OFFICE VISIT (OUTPATIENT)
Dept: INTERNAL MEDICINE CLINIC | Facility: CLINIC | Age: 24
End: 2019-10-25

## 2019-10-25 ENCOUNTER — HOSPITAL ENCOUNTER (EMERGENCY)
Facility: HOSPITAL | Age: 24
Discharge: HOME | End: 2019-10-25
Attending: EMERGENCY MEDICINE
Payer: COMMERCIAL

## 2019-10-25 ENCOUNTER — NURSE TRIAGE (OUTPATIENT)
Dept: OTHER | Age: 24
End: 2019-10-25

## 2019-10-25 VITALS
SYSTOLIC BLOOD PRESSURE: 160 MMHG | WEIGHT: 194 LBS | TEMPERATURE: 98 F | HEART RATE: 83 BPM | BODY MASS INDEX: 32 KG/M2 | DIASTOLIC BLOOD PRESSURE: 117 MMHG

## 2019-10-25 VITALS
SYSTOLIC BLOOD PRESSURE: 154 MMHG | OXYGEN SATURATION: 100 % | DIASTOLIC BLOOD PRESSURE: 107 MMHG | RESPIRATION RATE: 19 BRPM | HEART RATE: 85 BPM

## 2019-10-25 DIAGNOSIS — R11.0 NAUSEA: ICD-10-CM

## 2019-10-25 DIAGNOSIS — I16.1 HYPERTENSIVE EMERGENCY: ICD-10-CM

## 2019-10-25 DIAGNOSIS — R27.0 ATAXIA: ICD-10-CM

## 2019-10-25 DIAGNOSIS — R90.82 WHITE MATTER ABNORMALITY ON MRI OF BRAIN: ICD-10-CM

## 2019-10-25 DIAGNOSIS — R42 DIZZINESS: Primary | ICD-10-CM

## 2019-10-25 PROCEDURE — 70551 MRI BRAIN STEM W/O DYE: CPT | Performed by: EMERGENCY MEDICINE

## 2019-10-25 PROCEDURE — 83735 ASSAY OF MAGNESIUM: CPT | Performed by: EMERGENCY MEDICINE

## 2019-10-25 PROCEDURE — 81001 URINALYSIS AUTO W/SCOPE: CPT | Performed by: EMERGENCY MEDICINE

## 2019-10-25 PROCEDURE — 93010 ELECTROCARDIOGRAM REPORT: CPT | Performed by: EMERGENCY MEDICINE

## 2019-10-25 PROCEDURE — 80053 COMPREHEN METABOLIC PANEL: CPT | Performed by: EMERGENCY MEDICINE

## 2019-10-25 PROCEDURE — 81025 URINE PREGNANCY TEST: CPT

## 2019-10-25 PROCEDURE — 99284 EMERGENCY DEPT VISIT MOD MDM: CPT

## 2019-10-25 PROCEDURE — 93005 ELECTROCARDIOGRAM TRACING: CPT

## 2019-10-25 PROCEDURE — 99205 OFFICE O/P NEW HI 60 MIN: CPT | Performed by: INTERNAL MEDICINE

## 2019-10-25 PROCEDURE — 36415 COLL VENOUS BLD VENIPUNCTURE: CPT

## 2019-10-25 PROCEDURE — 85025 COMPLETE CBC W/AUTO DIFF WBC: CPT | Performed by: EMERGENCY MEDICINE

## 2019-10-25 RX ORDER — MECLIZINE HCL 12.5 MG/1
12.5 TABLET ORAL 3 TIMES DAILY PRN
Qty: 10 TABLET | Refills: 0 | Status: SHIPPED | OUTPATIENT
Start: 2019-10-25 | End: 2019-11-04

## 2019-10-25 RX ORDER — MECLIZINE HYDROCHLORIDE 25 MG/1
25 TABLET ORAL ONCE
Status: COMPLETED | OUTPATIENT
Start: 2019-10-25 | End: 2019-10-25

## 2019-10-25 NOTE — ED PROVIDER NOTES
Patient Seen in: [facility] AND [facility] Emergency Department    History   Patient presents with:  Dizziness (neurologic)  Neurologic Problem  Nausea  Hypertension (cardiovascular)    Stated Complaint: Dizziness    HPI    64-year-old female without past medical Systems :  Constitutional: As per HPI  Eyes: Negative for discharge and visual disturbance. Neurological: Negative for syncope and headaches. (+) dizziness. Positive for stated complaint:   Other systems are as noted in HPI.   Constitutional and vital Abnormality         Status                     ---------                               -----------         ------                     CBC W/ DIFFERENTIAL[[id number]]                              Final result                 Please view results fo intracranial vessels are visualized. CONCLUSION:  1. Limited ER protocol 3 sequence imaging of the brain was performed. No acute intracranial abnormality. Specifically, no evidence of acute or early subacute infarction.  2. Paranasal sinus mucosal

## 2019-10-25 NOTE — PROGRESS NOTES
History of Present Illness   Patient ID: Jitendra Dumont is a 22year old female.   Chief Complaint: Dizziness (c/o Nausea. )  Triage note reviewed:   10/25/19 3:03 PM   Shelbi Gonzalez RN routed this conversation to SARA Horne Sela Fire difficulty with finger nose test, no facial paralysis, she is alert, oriented x3, she is responding to questions appropriately; NIH 4 for BL drift. She has a history of anxiety/panic attack but feels calm at this time(no anxiety prior to onset).    She den finger-nose touch. ) and gait (unable to ambulate without using wall. ataxic) abnormal.   Motor 4/5 in all extremities, subjectively feels weak. When asked to perform pronator drift maneuver both arms drop slowly. Skin: Skin is warm and dry.    Psychiatri with thin section multi-planar T1, T2 weighted and FAT   suppression imaging without contrast.     PATIENT STATED HISTORY: (As transcribed by Technologist)  Patient  feels light headed and off balance for a few seconds upon standing up from siting. demyelinating process cannot be excluded. This is considered less likely. 2.  Gradient susceptibility has a medusa type pattern in the right frontal lobe that is suspicious for a developmental venous anomaly.      3.  Bilateral cranial nerves 7 and 8 foor another week, then 3 pills qhs, Disp: 90 tablet, Rfl: 3  •  gabapentin 300 MG Oral Cap, Take 1 capsule by mouth 2 (two) times daily. , Disp: , Rfl: 1  •  BusPIRone HCl 15 MG Oral Tab, Take 15 mg by mouth 2 (two) times daily. , Disp: , Rfl:        Assess

## 2019-10-25 NOTE — ED INITIAL ASSESSMENT (HPI)
Pt reports waking with dizziness and unsteady gait today. Seen at PCP office and sent r/t concerns of unsteady gait, elevated BP, and nausea. Pt denies CP/SOB.

## 2019-10-25 NOTE — TELEPHONE ENCOUNTER
Action Requested: Summary for Provider     []  Critical Lab, Recommendations Needed  [] Need Additional Advice  [x]   FYI    []   Need Orders  [] Need Medications Sent to Pharmacy  []  Other     SUMMARY: Patient woke up today with vertigo, feeling off charley

## 2019-10-29 NOTE — PROGRESS NOTES
Patient ID: Parehs Camacho is a 22year old female.     HPI  Patient presents with:  Hospital F/U: Pt was sent to ER for elevated BP    HPI from ER visit on 825/19:  56-year-old female without past medical history presenting for evaluation of [facility].     I reviewed labs with the pt. She was on her period at the time of her labs on 10/25/19. Mri Brain Wo Acute (3) Sequence (cpt=70551)    Result Date: 10/25/2019  CONCLUSION:  1.  Limited ER protocol 3 sequence imaging of the br 10/25/2019    GFRNAA 101 10/25/2019    CA 8.8 10/25/2019     10/25/2019    K 3.6 10/25/2019     10/25/2019    CO2 27.0 10/25/2019    OSMOCALC 287 10/25/2019       Lab Results   Component Value Date    COLORUR Yellow 10/25/2019    CLARITY Hazy ( History:   Diagnosis Date   • Anxiety    • Anxiety state, unspecified    • Deliberate self-cutting    • Depression    • Pleurisy        History reviewed. No pertinent surgical history.      Norgestim-Eth Estrad Triphasic (TRI-SPRINTEC) 0.18/0.215/0.25 MG-35 10/29/19  1127   BP: (!) 155/98 140/90   BP Location: Left arm    Patient Position: Sitting    Cuff Size: adult    Pulse: 97    Resp: 20    Temp: 98.8 °F (37.1 °C)    TempSrc: Oral    Weight: 190 lb (86.2 kg)    Height: 5' 5\" (1.651 m)            ASSESSME presence of Kamila Hsu DO. Electronically Signed: Cristhian Edmond, 10/29/2019, 11:13 AM.    I, Kamila Hsu DO,  personally performed the services described in this documentation.  All medical record entries made by the scribe were at my direction a

## 2019-12-27 DIAGNOSIS — F41.9 ANXIETY: ICD-10-CM

## 2019-12-27 DIAGNOSIS — R00.2 HEART PALPITATIONS: ICD-10-CM

## 2019-12-27 DIAGNOSIS — F41.0 PANIC ATTACKS: ICD-10-CM

## 2019-12-27 DIAGNOSIS — R03.0 ELEVATED BLOOD PRESSURE READING: ICD-10-CM

## 2019-12-27 RX ORDER — PROPRANOLOL HYDROCHLORIDE 10 MG/1
TABLET ORAL
Qty: 60 TABLET | Refills: 1 | Status: SHIPPED | OUTPATIENT
Start: 2019-12-27 | End: 2020-01-03

## 2019-12-27 NOTE — TELEPHONE ENCOUNTER
Refill passed per [facility] protocol.   Hypertensive Medications  Protocol Criteria:  · Appointment scheduled in the past 6 months or in the next 3 months  · BMP or CMP in the past 12 months  · Creatinine result < 2  Recent Outpatient Visits

## 2020-01-03 NOTE — PROGRESS NOTES
Patient ID: Scott Klein is a 22year old female. HPI  Patient presents with:  Blood Pressure: follow up  Medication Follow-Up    Last seen by me on 10/29/19. Started on propranolol 10 mg twice daily.     States she feels little stabbing heart pains a Medical History:   Diagnosis Date   • Anxiety    • Anxiety state, unspecified    • Deliberate self-cutting    • Depression    • Pleurisy        History reviewed. No pertinent surgical history.        Current Outpatient Medications   Medication Sig Dispense ASSESSMENT/PLAN:     Diagnoses and all orders for this visit:    Anxiety  -     Propranolol HCl ER 60 MG Oral Capsule SR 24 Hr; Take 1 capsule (60 mg total) by mouth daily. Lets try extended release propranolol and see if that helps.   The chest pain is

## 2020-02-12 RX ORDER — NORGESTIMATE AND ETHINYL ESTRADIOL 7DAYSX3 28
KIT ORAL
Qty: 84 TABLET | Refills: 1 | Status: SHIPPED | OUTPATIENT
Start: 2020-02-12 | End: 2020-08-17

## 2020-02-13 NOTE — TELEPHONE ENCOUNTER
Refill passed per [facility] protocol.   Gynecology Medications  Protocol Criteria:  · Appointment scheduled in the past 12 months or the next 3 months  · Pap smear in the past 12 months  · Pap smear WNL manually verified  Recent Outpatient Visits

## 2020-02-17 ENCOUNTER — TELEPHONE (OUTPATIENT)
Dept: GASTROENTEROLOGY | Facility: CLINIC | Age: 25
End: 2020-02-17

## 2020-02-17 DIAGNOSIS — R10.30 LOWER ABDOMINAL PAIN: ICD-10-CM

## 2020-02-17 DIAGNOSIS — R19.8 IRREGULAR BOWEL HABITS: Primary | ICD-10-CM

## 2020-02-17 DIAGNOSIS — K62.5 RECTAL BLEEDING: ICD-10-CM

## 2020-02-17 NOTE — TELEPHONE ENCOUNTER
Please see below, patient now wondering if she should proceed with a colonoscopy procedure. Would you like to see her first in the office? LOV 8/27/19 with Chencho Pastor for abdominal pain/bloating, irregular bowel habits, rectal bleeding.  At the time, lab wo she can proceed with the colonoscopy as discussed as an option in the past. I advised the patient to drink plenty of water and gatorade to replace her electrolytes.  Advised to proceed to ER if she develops vomiting, fever, copious blood in stool, severe ab

## 2020-02-17 NOTE — TELEPHONE ENCOUNTER
Nursing/scheduling: I would hold off on medication until we have negative stool studies back. Supportive care such as fluids, BRAT diet, rest, etc would be appropriate.      Stool studies ordered and may also schedule RILEY w/ Dr. Tanisha Bates, Dr. Leslie Santiago, or Dr. Charles Syed

## 2020-02-17 NOTE — TELEPHONE ENCOUNTER
Patient is still experiencing pain in stomach - lost weight (lost 3 lbs last week) because she cant eat properly. Says she has has diarrhea (at least 6 times a day). Wants to know what she can do to alleviate pain. Please call. Thank you.

## 2020-02-17 NOTE — TELEPHONE ENCOUNTER
Pt contacted, reviewed below. Noe Hayes, pt would like to do both stool testing and get scheduled for colonoscopy. She would like to know if any meds would help until then? . Thanks.

## 2020-02-17 NOTE — TELEPHONE ENCOUNTER
Pt contacted. She will get stool testing done. Accepted f/u appt with Marissa GALEAS on Tues March 3, instructed to arrive by 9:15am and given directions to [facility]. Reminded to get referral.    Forwarded to GI schedulers , See Rossy's orders below. Thanks.

## 2020-02-21 ENCOUNTER — TELEPHONE (OUTPATIENT)
Dept: GASTROENTEROLOGY | Facility: CLINIC | Age: 25
End: 2020-02-21

## 2020-02-24 NOTE — TELEPHONE ENCOUNTER
Left complete message on [facility] pharmacy voicemail that per the rx, may substitute with trilyte, or any generic.

## 2020-02-24 NOTE — TELEPHONE ENCOUNTER
Nursing: I sent Colyte to the pharmacy w/ note that this may be substituted for TriLyte or any generic equivalent.  If I need to resend something specifically please let me know

## 2020-03-11 ENCOUNTER — TELEPHONE (OUTPATIENT)
Dept: FAMILY MEDICINE CLINIC | Facility: CLINIC | Age: 25
End: 2020-03-11

## 2020-03-11 NOTE — TELEPHONE ENCOUNTER
Scheduled for:  Colonoscopy 51507  Provider Name: Dr. Abraham West  Date:  3/17/20  Location:  [facility]  Sedation:  MAC  Time:   0730 (pt is aware to arrive at 0630)   Prep:  Colyte, sent via VoAPPs/Allergies Reconciled?:  Physician reviewed   Diagnosis

## 2020-03-11 NOTE — TELEPHONE ENCOUNTER
Patient is requesting medication be removed from her medication list. [facility] message sent. Patient Review of Clinical Information      Problems    This clinical information was not verified.            Medications    This clinical information was not ve

## 2020-03-14 RX ORDER — PROPRANOLOL HYDROCHLORIDE 60 MG/1
60 TABLET ORAL 3 TIMES DAILY
COMMUNITY
End: 2020-04-18

## 2020-03-17 ENCOUNTER — HOSPITAL ENCOUNTER (OUTPATIENT)
Facility: HOSPITAL | Age: 25
Setting detail: HOSPITAL OUTPATIENT SURGERY
Discharge: HOME | End: 2020-03-17
Attending: INTERNAL MEDICINE | Admitting: INTERNAL MEDICINE
Payer: COMMERCIAL

## 2020-03-17 ENCOUNTER — ANESTHESIA (OUTPATIENT)
Dept: ENDOSCOPY | Facility: HOSPITAL | Age: 25
End: 2020-03-17
Payer: COMMERCIAL

## 2020-03-17 ENCOUNTER — ANESTHESIA EVENT (OUTPATIENT)
Dept: ENDOSCOPY | Facility: HOSPITAL | Age: 25
End: 2020-03-17
Payer: COMMERCIAL

## 2020-03-17 VITALS
HEART RATE: 73 BPM | HEIGHT: 66 IN | RESPIRATION RATE: 18 BRPM | BODY MASS INDEX: 29.89 KG/M2 | SYSTOLIC BLOOD PRESSURE: 136 MMHG | DIASTOLIC BLOOD PRESSURE: 90 MMHG | TEMPERATURE: 99 F | OXYGEN SATURATION: 99 % | WEIGHT: 186 LBS

## 2020-03-17 DIAGNOSIS — R10.30 LOWER ABDOMINAL PAIN: ICD-10-CM

## 2020-03-17 DIAGNOSIS — K62.5 RECTAL BLEEDING: ICD-10-CM

## 2020-03-17 DIAGNOSIS — R19.8 IRREGULAR BOWEL HABITS: ICD-10-CM

## 2020-03-17 LAB — B-HCG UR QL: NEGATIVE

## 2020-03-17 PROCEDURE — 0DBB8ZX EXCISION OF ILEUM, VIA NATURAL OR ARTIFICIAL OPENING ENDOSCOPIC, DIAGNOSTIC: ICD-10-PCS | Performed by: INTERNAL MEDICINE

## 2020-03-17 PROCEDURE — 0DBN8ZX EXCISION OF SIGMOID COLON, VIA NATURAL OR ARTIFICIAL OPENING ENDOSCOPIC, DIAGNOSTIC: ICD-10-PCS | Performed by: INTERNAL MEDICINE

## 2020-03-17 PROCEDURE — 0DBP8ZX EXCISION OF RECTUM, VIA NATURAL OR ARTIFICIAL OPENING ENDOSCOPIC, DIAGNOSTIC: ICD-10-PCS | Performed by: INTERNAL MEDICINE

## 2020-03-17 PROCEDURE — 45385 COLONOSCOPY W/LESION REMOVAL: CPT | Performed by: INTERNAL MEDICINE

## 2020-03-17 PROCEDURE — 45380 COLONOSCOPY AND BIOPSY: CPT | Performed by: INTERNAL MEDICINE

## 2020-03-17 RX ORDER — SODIUM CHLORIDE, SODIUM LACTATE, POTASSIUM CHLORIDE, CALCIUM CHLORIDE 600; 310; 30; 20 MG/100ML; MG/100ML; MG/100ML; MG/100ML
INJECTION, SOLUTION INTRAVENOUS CONTINUOUS
Status: DISCONTINUED | OUTPATIENT
Start: 2020-03-17 | End: 2020-03-17

## 2020-03-17 RX ORDER — LIDOCAINE HYDROCHLORIDE 10 MG/ML
INJECTION, SOLUTION EPIDURAL; INFILTRATION; INTRACAUDAL; PERINEURAL AS NEEDED
Status: DISCONTINUED | OUTPATIENT
Start: 2020-03-17 | End: 2020-03-17 | Stop reason: SURG

## 2020-03-17 RX ADMIN — LIDOCAINE HYDROCHLORIDE 50 MG: 10 INJECTION, SOLUTION EPIDURAL; INFILTRATION; INTRACAUDAL; PERINEURAL at 07:42:00

## 2020-03-17 RX ADMIN — SODIUM CHLORIDE, SODIUM LACTATE, POTASSIUM CHLORIDE, CALCIUM CHLORIDE: 600; 310; 30; 20 INJECTION, SOLUTION INTRAVENOUS at 08:13:00

## 2020-03-17 NOTE — ANESTHESIA PREPROCEDURE EVALUATION
Anesthesia PreOp Note    HPI:     Amy Reyna is a 22year old female who presents for preoperative consultation requested by: Alia Irwin MD    Date of Surgery: 3/17/2020    Procedure(s):  COLONOSCOPY  Indication: Irregular bowel habits, Lit Leonard current Epic-ordered outpatient medications on file.         Codeine                 NAUSEA AND VOMITING  Iodine (Topical)        RASH  Radiology Contrast *        Family History   Problem Relation Age of Onset   • Ovarian Cancer Maternal Grandmother    • D Asked        Sleep Concern: Not Asked        Stress Concern: Not Asked        Weight Concern: Not Asked        Special Diet: Not Asked        Back Care: Not Asked        Exercise: Not Asked        Bike Helmet: Not Asked        Seat Belt: Not Asked        S

## 2020-03-17 NOTE — ANESTHESIA POSTPROCEDURE EVALUATION
Patient: Ceclile Brewer    Procedure Summary     Date:  03/17/20 Room / Location:  [facility] ENDOSCOPY 01 / [facility] ENDOSCOPY    Anesthesia Start:  0740 Anesthesia Stop:  2684    Procedure:  COLONOSCOPY (N/A ) Diagnosis:       Irregular bowel habits      Lower abdom

## 2020-03-17 NOTE — H&P
History & Physical Examination    Patient Name: Rashmi Vaughn  MRN: [id number]  CSN: [id number]  YOB: 1995    Diagnosis: Diarrhea and rectal bleeding      Propranolol HCl 60 MG Oral Tab, Take 60 mg by mouth 3 (three) times daily. , Disp: , Rf

## 2020-03-17 NOTE — OPERATIVE REPORT
[facility] Endoscopy Report      Date of Procedure:  03/17/20      Preoperative Diagnosis:  Diarrhea and rectal bleeding      Postoperative Diagnosis:  Rectal polyp      Procedure:    Colonoscopy with polypectomy and biopsy      Surgeon:  Nishi Carvalho inflammatory polyp and could have been a source of rectal bleeding. 2.  Otherwise normal colonoscopy to the terminal ileum without visible signs of inflammatory bowel disease. Recommendations:  1. Standard post polypectomy instructions given.   2.  Steph Ramirez

## 2020-03-18 ENCOUNTER — TELEPHONE (OUTPATIENT)
Dept: GASTROENTEROLOGY | Facility: CLINIC | Age: 25
End: 2020-03-18

## 2020-03-18 NOTE — TELEPHONE ENCOUNTER
Spoke to Zan Carbajal informed her of Dr. Lucretia Calvert' note and that Wayne GALEAS may be calling her this week to further discuss results. No further questions at this time.

## 2020-04-18 ENCOUNTER — TELEPHONE (OUTPATIENT)
Dept: FAMILY MEDICINE CLINIC | Facility: CLINIC | Age: 25
End: 2020-04-18

## 2020-04-18 RX ORDER — PROPRANOLOL HYDROCHLORIDE 60 MG/1
60 TABLET ORAL 3 TIMES DAILY
Qty: 90 TABLET | Refills: 0 | Status: SHIPPED | OUTPATIENT
Start: 2020-04-18 | End: 2020-04-21

## 2020-04-18 NOTE — TELEPHONE ENCOUNTER
She clearly needs to be seen soon. She has anxiety and depression and she is on medication that can also slow down the heart. I need to listen to her heart. As long as there is no coughing or fevers that should be fine. You could set her up for Monday or Tuesday. Monday it looks like I am working all day but Tuesday the office hours will be before noon.

## 2020-04-21 PROBLEM — R00.2 HEART PALPITATIONS: Status: ACTIVE | Noted: 2020-04-21

## 2020-04-21 NOTE — PATIENT INSTRUCTIONS
Return to clinic for nurse visit after June 21, 2020 for the second HPV injection and then the nurse will have you come back 4 months later for the third HPV injection.

## 2020-04-21 NOTE — PROGRESS NOTES
Patient ID: Paresh Camacho is a 22year old female. HPI  Patient presents with:  Blood Pressure: follow up/medication refill    Last seen by me on 1/3/20.   She has been on propranolol 60 mg daily extended release which helps with palpitations and panic vaccination. Discussed the importance of this vaccine with the pt. She agrees to receive her first HPV shot today. Has seen the gynecologist  and up-to-date.       Wt Readings from Last 6 Encounters:  04/21/20 : 188 lb 9.6 oz (85.5 kg)  03/14/20 : 186 lb ( Allergies:  Codeine                 NAUSEA AND VOMITING  Iodine (Topical)        RASH  Radiology Contrast *         Physical Exam:       Physical Exam   Physical Exam   Constitutional: Patient is oriented to person, place, and time.  Patient appears wel 1 capsule (300 mg total) by mouth daily. Heart palpitations  -     Propranolol HCl ER 60 MG Oral Capsule SR 24 Hr; Take 1 capsule (60 mg total) by mouth daily.   Rare heart palpitations  Need for vaccination  -     IMMUNIZATION ADMINISTRATION  -     HPV

## 2020-08-17 ENCOUNTER — TELEPHONE (OUTPATIENT)
Dept: FAMILY MEDICINE CLINIC | Facility: CLINIC | Age: 25
End: 2020-08-17

## 2020-08-17 RX ORDER — NORGESTIMATE AND ETHINYL ESTRADIOL 7DAYSX3 28
1 KIT ORAL DAILY
Qty: 84 TABLET | Refills: 0 | Status: SHIPPED | OUTPATIENT
Start: 2020-08-17 | End: 2020-11-20

## 2020-09-14 ENCOUNTER — NURSE TRIAGE (OUTPATIENT)
Dept: FAMILY MEDICINE CLINIC | Facility: CLINIC | Age: 25
End: 2020-09-14

## 2020-09-14 NOTE — TELEPHONE ENCOUNTER
Patient was left a message to call back for appt. Transfer to triage dept [id number]   Office hours left on .

## 2020-09-14 NOTE — TELEPHONE ENCOUNTER
Triage further    ----- Message from Ivan Srinivasan sent at 9/14/2020  9:28 AM CDT -----  Regarding: Non-Urgent Medical Question  Contact: [phone]  I do not know if I need to make an appointment with you or not.  Sometimes I've been  having trouble sw

## 2020-09-15 ENCOUNTER — OFFICE VISIT (OUTPATIENT)
Dept: FAMILY MEDICINE CLINIC | Facility: CLINIC | Age: 25
End: 2020-09-15

## 2020-09-15 VITALS
HEART RATE: 70 BPM | DIASTOLIC BLOOD PRESSURE: 89 MMHG | WEIGHT: 192.81 LBS | BODY MASS INDEX: 30.99 KG/M2 | HEIGHT: 66 IN | SYSTOLIC BLOOD PRESSURE: 132 MMHG

## 2020-09-15 DIAGNOSIS — Z23 INFLUENZA VACCINE NEEDED: ICD-10-CM

## 2020-09-15 DIAGNOSIS — J30.1 SEASONAL ALLERGIC RHINITIS DUE TO POLLEN: Primary | ICD-10-CM

## 2020-09-15 PROBLEM — R19.7 DIARRHEA: Status: RESOLVED | Noted: 2019-08-16 | Resolved: 2020-09-15

## 2020-09-15 PROCEDURE — 3079F DIAST BP 80-89 MM HG: CPT | Performed by: NURSE PRACTITIONER

## 2020-09-15 PROCEDURE — 99213 OFFICE O/P EST LOW 20 MIN: CPT | Performed by: NURSE PRACTITIONER

## 2020-09-15 PROCEDURE — 3008F BODY MASS INDEX DOCD: CPT | Performed by: NURSE PRACTITIONER

## 2020-09-15 PROCEDURE — 90471 IMMUNIZATION ADMIN: CPT | Performed by: NURSE PRACTITIONER

## 2020-09-15 PROCEDURE — 3075F SYST BP GE 130 - 139MM HG: CPT | Performed by: NURSE PRACTITIONER

## 2020-09-15 PROCEDURE — 90686 IIV4 VACC NO PRSV 0.5 ML IM: CPT | Performed by: NURSE PRACTITIONER

## 2020-09-15 RX ORDER — FLUTICASONE PROPIONATE 50 MCG
2 SPRAY, SUSPENSION (ML) NASAL DAILY
Qty: 3 BOTTLE | Refills: 3 | Status: SHIPPED | OUTPATIENT
Start: 2020-09-15 | End: 2021-09-10

## 2020-09-15 NOTE — TELEPHONE ENCOUNTER
Action Requested: Summary for Provider     []  Critical Lab, Recommendations Needed  [] Need Additional Advice  []   FYI    []   Need Orders  [] Need Medications Sent to Pharmacy  []  Other     SUMMARY:    Appointment made for today 9/15/2020    The patien

## 2020-09-15 NOTE — PROGRESS NOTES
HPI  Pt here for difficultly with swallowing. Feels like something is stuck in her throat sometimes. Sometimes it is hard to swallow saliva. Denies nasal congestion, sore throat or post nasal drip. Has to clear throat often.      Review of Systems   Constit on file      Food insecurity:        Worry: Not on file        Inability: Not on file      Transportation needs:        Medical: Not on file        Non-medical: Not on file    Tobacco Use      Smoking status: Never Smoker      Smokeless tobacco: Never Used total) by mouth daily. 90 capsule 1       Allergies:    Codeine                 NAUSEA AND VOMITING  Iodine (Topical)        RASH  Radiology Contrast *        Physical Exam   Nursing note and vitals reviewed.    Constitutional: She is oriented to person, pl Propionate 50 MCG/ACT Nasal Suspension      Other Visit Diagnoses     Influenza vaccine needed        Relevant Orders    FLULAVAL INFLUENZA VACCINE QUAD PRESERVATIVE FREE 0.5 ML                      Discussed plan of care with pt and pt is in agreement. All

## 2020-09-15 NOTE — PATIENT INSTRUCTIONS
Allergic Rhinitis  Allergic rhinitis is an allergic reaction that affects the nose, and often the eyes. It’s often known as nasal allergies. Nasal allergies are often due to things in the environment that are breathed in.  Depending what you are sensitive · Keep humidity low by using a dehumidifier or air conditioner. Keep the dehumidifier and air conditioner clean and free of mold. · Clean moldy areas with bleach and water. Don't mix bleach with other . In general:  · Vacuum once or twice a week. An allergy is a reaction to a substance called an allergen.  Common allergens include:  · Wind-borne pollen (from grass, trees, ragweed)  · Mold  · Dust mites  · Furry and feathered animals  · Pests, such as cockroaches or rodents in a home or other buildin Your healthcare provider will evaluate you to find the cause of your symptoms. Then he or she will advise treatment.  If your symptoms are due to nasal allergies, your provider may prescribe nasal steroid sprays or antihistamines taken by mouth (oral) to he Allergies cause nasal passages to swell. Constant swelling can lead to formation of a sac that comes from the sinuses called a polyp. Polyps can grow large enough to block nasal passages. Nasal polyps (unlike other polyps) don't cause cancer.  But they can

## 2020-11-19 ENCOUNTER — TELEMEDICINE (OUTPATIENT)
Dept: FAMILY MEDICINE CLINIC | Facility: CLINIC | Age: 25
End: 2020-11-19

## 2020-11-19 DIAGNOSIS — Z20.822 SUSPECTED COVID-19 VIRUS INFECTION: Primary | ICD-10-CM

## 2020-11-19 PROCEDURE — 99213 OFFICE O/P EST LOW 20 MIN: CPT | Performed by: NURSE PRACTITIONER

## 2020-11-19 NOTE — PROGRESS NOTES
Telemedicine Visit for Respiratory Illness - Potential COVID-19 Infection    Virtual/Telephone Check-In    Mike Bravo verbally consents to a Virtual/Telephone Check-In service on 11/19/20.  Patient understands and accepts financial responsibility for an allergic rhinitis due to pollen     Suspected COVID-19 virus infection    Current Outpatient Medications   Medication Sig Dispense Refill   • Fluticasone Propionate 50 MCG/ACT Nasal Suspension 2 sprays by Each Nare route daily for 360 doses.  3 Bottle 3   • because of restrictions of visitation. There are limitations of this visit as no physical exam could be performed. Every conscious effort was taken to allow for sufficient and adequate time.   This billing was spent on reviewing labs, medications, radiolo

## 2020-11-19 NOTE — ASSESSMENT & PLAN NOTE
covid 19 test ordered  Discussed self isolation per cdc guidelines  Supportive care discussed to help alleviate symptoms  Please call if symptoms worsen or are not resolving.

## 2020-11-20 ENCOUNTER — APPOINTMENT (OUTPATIENT)
Dept: LAB | Facility: HOSPITAL | Age: 25
End: 2020-11-20
Attending: NURSE PRACTITIONER
Payer: COMMERCIAL

## 2020-11-20 DIAGNOSIS — Z20.822 SUSPECTED COVID-19 VIRUS INFECTION: ICD-10-CM

## 2020-11-20 RX ORDER — NORGESTIMATE AND ETHINYL ESTRADIOL 7DAYSX3 28
1 KIT ORAL DAILY
Qty: 84 TABLET | Refills: 3 | Status: SHIPPED | OUTPATIENT
Start: 2020-11-20 | End: 2021-08-18

## 2020-12-17 ENCOUNTER — PATIENT MESSAGE (OUTPATIENT)
Dept: GASTROENTEROLOGY | Facility: CLINIC | Age: 25
End: 2020-12-17

## 2020-12-17 NOTE — TELEPHONE ENCOUNTER
From: Mike Bravo  To: LANCE Romero  Sent: 12/17/2020 3:05 PM CST  Subject: Visit Follow-up Question    Is there a blood test or anything that can be done to test food allergy's or gluten issues?  Suddenly every time I eat something that contains

## 2020-12-17 NOTE — TELEPHONE ENCOUNTER
Chan Santana     Please advise on message from patient in regards to concern for gluten sensitivity and work up.     Thank you

## 2021-02-05 DIAGNOSIS — R00.2 HEART PALPITATIONS: ICD-10-CM

## 2021-02-06 RX ORDER — PROPRANOLOL HCL 60 MG
60 CAPSULE, EXTENDED RELEASE 24HR ORAL DAILY
Qty: 90 CAPSULE | Refills: 0 | Status: SHIPPED | OUTPATIENT
Start: 2021-02-06 | End: 2021-05-08

## 2021-02-08 ENCOUNTER — NURSE TRIAGE (OUTPATIENT)
Dept: FAMILY MEDICINE CLINIC | Facility: CLINIC | Age: 26
End: 2021-02-08

## 2021-02-08 ENCOUNTER — TELEMEDICINE (OUTPATIENT)
Dept: FAMILY MEDICINE CLINIC | Facility: CLINIC | Age: 26
End: 2021-02-08

## 2021-02-08 DIAGNOSIS — J02.9 SORE THROAT: Primary | ICD-10-CM

## 2021-02-08 PROCEDURE — 99213 OFFICE O/P EST LOW 20 MIN: CPT | Performed by: PHYSICIAN ASSISTANT

## 2021-02-08 NOTE — PROGRESS NOTES
HPI: HPI   Patient complaints of sore throat and left lymph node pain for the past 3 days. Patient is able to eat and drink with discomfort. Patient took Advil 3 days ago. Covid 19 result was negative yesterday.   Patient denies of fever, N/V/C/D, coug status: Single      Spouse name: Not on file      Number of children: Not on file      Years of education: Not on file      Highest education level: Not on file    Occupational History      Not on file    Social Needs      Financial resource strain: Not on for sore throat. Negative for congestion, ear discharge, ear pain, postnasal drip, rhinorrhea and sinus pressure. Respiratory: Negative for cough, chest tightness, shortness of breath and wheezing.     Cardiovascular: Negative for chest pain and palpitat

## 2021-02-08 NOTE — TELEPHONE ENCOUNTER
Action Requested: Summary for Provider     []  Critical Lab, Recommendations Needed  [] Need Additional Advice  []   FYI    []   Need Orders  [] Need Medications Sent to Pharmacy  []  Other     SUMMARY:      Virtual appointment made for today  2/68/21    T

## 2021-04-12 NOTE — PROGRESS NOTES
HPI:     HPI  26year-old female is here in the office complaining of headache intermittently in the past 2 months. Patient was seen by Dr Berhane Bustamante 2 weeks ago and ordered MRI of brain which was no mass or tumor.  Dr Berhane Bustamante prescribed Topamax 25 mg BID yester Today's visit was performed with the assistance of  Services.   Name of : Jennifer #[id number]   Service used: Video: Third Party      Pleurisy        Past Surgical History:   History reviewed. No pertinent surgical history.     Family History:     Family History   Problem Relation Age of Onset   • Ovarian Cancer Maternal Grandmother    • Diabetes Maternal Grandfather    • Heart Disease Miguel Alston Asked        Special Diet: Not Asked        Back Care: Not Asked        Exercise: Not Asked        Bike Helmet: Not Asked        Seat Belt: Not Asked        Self-Exams: Not Asked    Social History Narrative      Not on file      Review of Systems:   Review strength and normal reflexes. No sensory deficit. Skin: Skin is intact. No rash noted. Psychiatric: She has a normal mood and affect. Positive Romberg.     Assessment and Plan[de-identified]     Problem List Items Addressed This Visit        Other    Imbalance - P

## 2021-04-16 ENCOUNTER — NURSE TRIAGE (OUTPATIENT)
Dept: FAMILY MEDICINE CLINIC | Facility: CLINIC | Age: 26
End: 2021-04-16

## 2021-04-16 NOTE — TELEPHONE ENCOUNTER
Action Requested: Summary for Provider     []  Critical Lab, Recommendations Needed  [x] Need Additional Advice  []   FYI    []   Need Orders  [] Need Medications Sent to Pharmacy  []  Other     SUMMARY: Last 4 days patient has noticed dark brown vaginal s

## 2021-05-06 DIAGNOSIS — R00.2 HEART PALPITATIONS: ICD-10-CM

## 2021-05-08 RX ORDER — PROPRANOLOL HCL 60 MG
60 CAPSULE, EXTENDED RELEASE 24HR ORAL DAILY
Qty: 90 CAPSULE | Refills: 0 | Status: SHIPPED | OUTPATIENT
Start: 2021-05-08 | End: 2022-02-04

## 2021-08-18 RX ORDER — NORGESTIMATE AND ETHINYL ESTRADIOL 7DAYSX3 28
1 KIT ORAL DAILY
Qty: 84 TABLET | Refills: 3 | Status: SHIPPED | OUTPATIENT
Start: 2021-08-18

## 2021-08-24 NOTE — TELEPHONE ENCOUNTER
I left a detailed voicemail for the patient (okay per HIPAA) regarding her symptoms. It seems that she had essentially symptom relief until 1 week ago when she began to have watery, nonbloody diarrhea.   Given the time of year, it could increase the risk o Render In Strict Bullet Format?: No Continue Regimen: PanoXyl to face and body\\nEpiduo Forte to face QHS\\nAmzeeq topical foam to face chest and back QAM after cleansing Plan: pt doesn't want oral med Detail Level: Zone

## 2021-09-07 ENCOUNTER — LAB ENCOUNTER (OUTPATIENT)
Dept: LAB | Age: 26
End: 2021-09-07
Attending: NURSE PRACTITIONER
Payer: COMMERCIAL

## 2021-09-07 ENCOUNTER — OFFICE VISIT (OUTPATIENT)
Dept: INTERNAL MEDICINE CLINIC | Facility: CLINIC | Age: 26
End: 2021-09-07

## 2021-09-07 VITALS
HEIGHT: 66 IN | DIASTOLIC BLOOD PRESSURE: 84 MMHG | SYSTOLIC BLOOD PRESSURE: 138 MMHG | HEART RATE: 75 BPM | WEIGHT: 168.19 LBS | BODY MASS INDEX: 27.03 KG/M2 | OXYGEN SATURATION: 98 %

## 2021-09-07 DIAGNOSIS — R63.4 WEIGHT LOSS, UNINTENTIONAL: ICD-10-CM

## 2021-09-07 DIAGNOSIS — F41.9 ANXIETY: ICD-10-CM

## 2021-09-07 DIAGNOSIS — R10.30 LOWER ABDOMINAL PAIN: Primary | ICD-10-CM

## 2021-09-07 DIAGNOSIS — R19.7 DIARRHEA, UNSPECIFIED TYPE: ICD-10-CM

## 2021-09-07 LAB
ALBUMIN SERPL-MCNC: 3.8 G/DL (ref 3.4–5)
ALBUMIN/GLOB SERPL: 1.1 {RATIO} (ref 1–2)
ALP LIVER SERPL-CCNC: 48 U/L
ALT SERPL-CCNC: 31 U/L
ANION GAP SERPL CALC-SCNC: 5 MMOL/L (ref 0–18)
AST SERPL-CCNC: 11 U/L (ref 15–37)
BASOPHILS # BLD AUTO: 0.06 X10(3) UL (ref 0–0.2)
BASOPHILS NFR BLD AUTO: 0.8 %
BILIRUB SERPL-MCNC: 0.3 MG/DL (ref 0.1–2)
BILIRUB UR QL: NEGATIVE
BILIRUBIN: NEGATIVE
BUN BLD-MCNC: 8 MG/DL (ref 7–18)
BUN/CREAT SERPL: 10.7 (ref 10–20)
CALCIUM BLD-MCNC: 8.6 MG/DL (ref 8.5–10.1)
CHLORIDE SERPL-SCNC: 106 MMOL/L (ref 98–112)
CO2 SERPL-SCNC: 28 MMOL/L (ref 21–32)
COLOR UR: YELLOW
CREAT BLD-MCNC: 0.75 MG/DL
CRP SERPL-MCNC: 0.57 MG/DL (ref ?–0.3)
DEPRECATED RDW RBC AUTO: 37.6 FL (ref 35.1–46.3)
EOSINOPHIL # BLD AUTO: 0.23 X10(3) UL (ref 0–0.7)
EOSINOPHIL NFR BLD AUTO: 3.2 %
ERYTHROCYTE [DISTWIDTH] IN BLOOD BY AUTOMATED COUNT: 11.9 % (ref 11–15)
ERYTHROCYTE [SEDIMENTATION RATE] IN BLOOD: 12 MM/HR
GLOBULIN PLAS-MCNC: 3.4 G/DL (ref 2.8–4.4)
GLUCOSE (URINE DIPSTICK): NEGATIVE MG/DL
GLUCOSE BLD-MCNC: 85 MG/DL (ref 70–99)
GLUCOSE UR-MCNC: NEGATIVE MG/DL
HCT VFR BLD AUTO: 40.1 %
HGB BLD-MCNC: 12.9 G/DL
IMM GRANULOCYTES # BLD AUTO: 0.02 X10(3) UL (ref 0–1)
IMM GRANULOCYTES NFR BLD: 0.3 %
KETONES (URINE DIPSTICK): NEGATIVE MG/DL
KETONES UR-MCNC: NEGATIVE MG/DL
LEUKOCYTE ESTERASE UR QL STRIP.AUTO: NEGATIVE
LEUKOCYTES: NEGATIVE
LYMPHOCYTES # BLD AUTO: 2.06 X10(3) UL (ref 1–4)
LYMPHOCYTES NFR BLD AUTO: 28.3 %
M PROTEIN MFR SERPL ELPH: 7.2 G/DL (ref 6.4–8.2)
MCH RBC QN AUTO: 27.6 PG (ref 26–34)
MCHC RBC AUTO-ENTMCNC: 32.2 G/DL (ref 31–37)
MCV RBC AUTO: 85.7 FL
MONOCYTES # BLD AUTO: 0.59 X10(3) UL (ref 0.1–1)
MONOCYTES NFR BLD AUTO: 8.1 %
MULTISTIX LOT#: ABNORMAL NUMERIC
NEUTROPHILS # BLD AUTO: 4.32 X10 (3) UL (ref 1.5–7.7)
NEUTROPHILS # BLD AUTO: 4.32 X10(3) UL (ref 1.5–7.7)
NEUTROPHILS NFR BLD AUTO: 59.3 %
NITRITE UR QL STRIP.AUTO: NEGATIVE
NITRITE, URINE: NEGATIVE
OSMOLALITY SERPL CALC.SUM OF ELEC: 286 MOSM/KG (ref 275–295)
PATIENT FASTING Y/N/NP: NO
PH UR: 5 [PH] (ref 5–8)
PH, URINE: 5.5 (ref 4.5–8)
PLATELET # BLD AUTO: 237 10(3)UL (ref 150–450)
POTASSIUM SERPL-SCNC: 3.8 MMOL/L (ref 3.5–5.1)
PROT UR-MCNC: NEGATIVE MG/DL
PROTEIN (URINE DIPSTICK): NEGATIVE MG/DL
RBC # BLD AUTO: 4.68 X10(6)UL
SODIUM SERPL-SCNC: 139 MMOL/L (ref 136–145)
SP GR UR STRIP: 1.02 (ref 1–1.03)
SPECIFIC GRAVITY: 1.03 (ref 1–1.03)
TSI SER-ACNC: 1.02 MIU/ML (ref 0.36–3.74)
URINE-COLOR: YELLOW
UROBILINOGEN UR STRIP-ACNC: <2
UROBILINOGEN,SEMI-QN: 0.2 MG/DL (ref 0–1.9)
WBC # BLD AUTO: 7.3 X10(3) UL (ref 4–11)

## 2021-09-07 PROCEDURE — 85652 RBC SED RATE AUTOMATED: CPT | Performed by: NURSE PRACTITIONER

## 2021-09-07 PROCEDURE — 86140 C-REACTIVE PROTEIN: CPT | Performed by: NURSE PRACTITIONER

## 2021-09-07 PROCEDURE — 99214 OFFICE O/P EST MOD 30 MIN: CPT | Performed by: NURSE PRACTITIONER

## 2021-09-07 PROCEDURE — 80053 COMPREHEN METABOLIC PANEL: CPT | Performed by: NURSE PRACTITIONER

## 2021-09-07 PROCEDURE — 3075F SYST BP GE 130 - 139MM HG: CPT | Performed by: NURSE PRACTITIONER

## 2021-09-07 PROCEDURE — 81002 URINALYSIS NONAUTO W/O SCOPE: CPT | Performed by: NURSE PRACTITIONER

## 2021-09-07 PROCEDURE — 85025 COMPLETE CBC W/AUTO DIFF WBC: CPT | Performed by: NURSE PRACTITIONER

## 2021-09-07 PROCEDURE — 84443 ASSAY THYROID STIM HORMONE: CPT | Performed by: NURSE PRACTITIONER

## 2021-09-07 PROCEDURE — 36415 COLL VENOUS BLD VENIPUNCTURE: CPT | Performed by: NURSE PRACTITIONER

## 2021-09-07 PROCEDURE — 3008F BODY MASS INDEX DOCD: CPT | Performed by: NURSE PRACTITIONER

## 2021-09-07 PROCEDURE — 3079F DIAST BP 80-89 MM HG: CPT | Performed by: NURSE PRACTITIONER

## 2021-09-07 NOTE — PROGRESS NOTES
HPI:    Patient ID: Roberto Bergman is a 23year old female. Pt of Dr. Jennifer Mora seeing pt for the first time. Exercise level:walking 2-3 times week 1 hr and HAS NOT been following low salt diet. Weight has decreased 24 lbs in 1 yr. Has anxiety.   Pt polyp   Electronically signed by Butch Cleary MD on 3/17/2020 at  2:16 PM    Abdominal Pain  This is a recurrent problem. The current episode started more than 1 year ago. The onset quality is undetermined. The problem occurs intermittently.  The most recen Propranolol HCl ER 60 MG Oral Capsule SR 24 Hr Take 1 capsule (60 mg total) by mouth daily. 90 capsule 0   • Fluticasone Propionate 50 MCG/ACT Nasal Suspension 2 sprays by Each Nare route daily for 360 doses.  3 Bottle 3     Allergies:  Codeine Extraocular movements intact. Conjunctiva/sclera: Conjunctivae normal.      Pupils: Pupils are equal, round, and reactive to light. Cardiovascular:      Rate and Rhythm: Normal rate and regular rhythm. Pulses: Normal pulses.            Carotid p Tristan Valle Rules for Coping with Panic      1) Remember that although your feelings and symptoms are frightening, they are neither dangerous nor harmful.   2) Understand that what you are experiencing is merely an exaggeration of your normal reactions to

## 2021-09-07 NOTE — PATIENT INSTRUCTIONS
ASSESSMENT/PLAN:   Lower abdominal pain  (primary encounter diagnosis)  Check urine  Check blood  Increase fluids  Follow-up with gastroenterology. Referral entered. Weight loss, unintentional  Follow-up with gastroenterology referral entered.     Yoly (OPT)      Giardia + Crypto Antigen, Stool      Meds This Visit:  Requested Prescriptions      No prescriptions requested or ordered in this encounter       Imaging & Referrals:  GASTRO - INTERNAL

## 2021-10-04 ENCOUNTER — TELEPHONE (OUTPATIENT)
Dept: INTERNAL MEDICINE CLINIC | Facility: CLINIC | Age: 26
End: 2021-10-04

## 2021-10-04 NOTE — TELEPHONE ENCOUNTER
Ifeanyi Benjamin APRN Hi Carolene Sarah,     I attempted to reach your patient again today and she hung up after I stated where I was calling from. She does have my contact information and our 24/7 crisis line if she changes her mind.      Thank yo

## 2021-11-07 ENCOUNTER — TELEPHONE (OUTPATIENT)
Dept: FAMILY MEDICINE CLINIC | Facility: CLINIC | Age: 26
End: 2021-11-07

## 2021-11-08 NOTE — TELEPHONE ENCOUNTER
----- Message from Jaqui Lyons sent at 11/6/2021  4:26 PM CDT -----  Regarding: Anxiety  Hello,  I had a question.  Dimitri Alford been experiencing alot of anxiety the past few weeks and I’m not sure if I need to be back on my medicine again or if I need to see

## 2021-11-08 NOTE — TELEPHONE ENCOUNTER
Left message to call back. Please transfer to triage. 1st attempt. Noted pt has read our [employer] message to call us.

## 2021-12-02 ENCOUNTER — TELEMEDICINE (OUTPATIENT)
Dept: FAMILY MEDICINE CLINIC | Facility: CLINIC | Age: 26
End: 2021-12-02

## 2021-12-02 DIAGNOSIS — J40 BRONCHITIS: ICD-10-CM

## 2021-12-02 DIAGNOSIS — R06.7 SNEEZING: ICD-10-CM

## 2021-12-02 DIAGNOSIS — R06.2 WHEEZING: ICD-10-CM

## 2021-12-02 DIAGNOSIS — R05.9 COUGH: Primary | ICD-10-CM

## 2021-12-02 PROCEDURE — 99213 OFFICE O/P EST LOW 20 MIN: CPT | Performed by: FAMILY MEDICINE

## 2021-12-02 RX ORDER — ALBUTEROL SULFATE 90 UG/1
2 AEROSOL, METERED RESPIRATORY (INHALATION) EVERY 6 HOURS PRN
Qty: 1 EACH | Refills: 0 | Status: SHIPPED | OUTPATIENT
Start: 2021-12-02 | End: 2021-12-30

## 2021-12-02 RX ORDER — PREDNISONE 20 MG/1
TABLET ORAL
Qty: 10 TABLET | Refills: 0 | Status: SHIPPED | OUTPATIENT
Start: 2021-12-02 | End: 2022-02-04

## 2021-12-02 NOTE — PROGRESS NOTES
VIDEO VISIT PROGRESS NOTE  Todays date: 12/2/2021 12:26 PM    Due to the COVID-19 emergency implementation plan, this patient's visit was converted to a video visit as agreed upon with the patient.     Epic Video Check-In    Serene Corners verbally consents throat. Pt felt cold last night but woke up at 4 am sweating. She has been checking her temperature and states it has been 96 degrees. Pt has congestion and is blowing her nose which is productive of clear mucus.  Her lungs hurt when she coughs and she is w N/A  []            Physical Exam:   Limited examination due to this being a video visit       Patient was speaking in complete sentences, no increased work of breathing and very coherent and alert on the phone. Alert and oriented x 3.  Patient appears well 2 by mouth at same time daily for 5 days. (Best taken at [facility] or LUNCH)    Bronchitis  -     albuterol 108 (90 Base) MCG/ACT Inhalation Aero Soln; Inhale 2 puffs into the lungs every 6 (six) hours as needed for Wheezing or Shortness of Breath.  For abo Liya Clinton advised to follow CDC guidelines for self isolation and symptomatic treatment as outlined on CDC Patient Guidelines. Liya Clinton understands video evaluation is not a substitute for face-to-face examination or emergency care.  Markel

## 2021-12-06 ENCOUNTER — TELEMEDICINE (OUTPATIENT)
Dept: FAMILY MEDICINE CLINIC | Facility: CLINIC | Age: 26
End: 2021-12-06

## 2021-12-06 DIAGNOSIS — R63.0 DECREASED APPETITE: ICD-10-CM

## 2021-12-06 DIAGNOSIS — R05.9 COUGH: Primary | ICD-10-CM

## 2021-12-06 DIAGNOSIS — R53.1 GENERALIZED WEAKNESS: ICD-10-CM

## 2021-12-06 PROCEDURE — 99213 OFFICE O/P EST LOW 20 MIN: CPT | Performed by: FAMILY MEDICINE

## 2021-12-06 RX ORDER — BENZONATATE 200 MG/1
200 CAPSULE ORAL 3 TIMES DAILY PRN
Qty: 15 CAPSULE | Refills: 0 | Status: SHIPPED | OUTPATIENT
Start: 2021-12-06 | End: 2021-12-11

## 2021-12-06 RX ORDER — AZITHROMYCIN 250 MG/1
TABLET, FILM COATED ORAL
Qty: 6 TABLET | Refills: 0 | Status: SHIPPED | OUTPATIENT
Start: 2021-12-06 | End: 2021-12-11

## 2021-12-06 NOTE — PROGRESS NOTES
VIDEO VISIT PROGRESS NOTE  Todays date: 12/6/2021 9:01 AM    Due to the COVID-19 emergency implementation plan, this patient's visit was converted to a video visit as agreed upon with the patient.     Epic Video Check-In    Dene Caren verbally consents decreased appetite. Her headache and nasal congestion are resolved. She denies fever. Her temperature has been 96.3 or 96.5 degrees. Pt took her last dose of prednisone this morning. Pt took her fiance's cough medicine with no relief on 12/5/2021.  She has Waxing/Waning  []  N/A  []            Physical Exam:   Limited examination due to this being a video visit       Patient was speaking in complete sentences, no increased work of breathing and very coherent and alert on the phone. Alert and oriented x 3. Reviewed Past Medical History:  Past Medical History:   Diagnosis Date   • Anxiety    • Anxiety state, unspecified    • Deliberate self-cutting    • Depression    • High blood pressure    • Pleurisy       Reviewed Family History:  Family History   Proble care in the best interest of the provider-patient relationship, due to the ongoing public health crisis/national emergency and because of restrictions of visitation. There are limitations of this visit as no physical exam could be performed.   Every consci

## 2021-12-08 NOTE — ED AVS SNAPSHOT
[facility] Emergency Department    [address]     [place] [address]    Phone:  [phone]    Fax:  [phone]           Debi Mack   MRN: [id number]    Department:  [facility] Emergency Department   Date of Visit:  3/8/2 What Type Of Note Output Would You Prefer (Optional)?: Bullet Format Hpi Title: Evaluation of Skin Lesions If you have difficulty scheduling your follow-up appointment as directed, please call our  at [phone]. Si tiene problemas para programar connie radha de seguimiento según lo indicado, llame al encargado de martell al [phone].     It i How Severe Are Your Spot(S)?: mild continue to take your medications as instructed by your Primary Care doctor until you can check with your doctor. Please bring the medication list to your next doctor's appointment.     Any imaging studies and labs completed today can be reviewed in your M Have Your Spot(S) Been Treated In The Past?: has not been treated Medicaid plans. To get signed up and covered, please call [phone] and ask to get set up for an insurance coverage that is in-network with [employer] [employer].         Marcos     Call the [employer] for assistance with your inactive [employer] account

## 2021-12-30 DIAGNOSIS — R06.7 SNEEZING: ICD-10-CM

## 2021-12-30 DIAGNOSIS — J40 BRONCHITIS: ICD-10-CM

## 2021-12-30 DIAGNOSIS — R06.2 WHEEZING: ICD-10-CM

## 2021-12-30 DIAGNOSIS — R05.9 COUGH: ICD-10-CM

## 2021-12-30 RX ORDER — ALBUTEROL SULFATE 90 UG/1
AEROSOL, METERED RESPIRATORY (INHALATION)
Qty: 6.7 G | Refills: 0 | Status: SHIPPED | OUTPATIENT
Start: 2021-12-30 | End: 2022-02-04

## 2022-01-02 LAB — AMB EXT COVID-19 RESULT: DETECTED

## 2022-01-03 ENCOUNTER — TELEPHONE (OUTPATIENT)
Dept: FAMILY MEDICINE CLINIC | Facility: CLINIC | Age: 27
End: 2022-01-03

## 2022-01-03 ENCOUNTER — TELEMEDICINE (OUTPATIENT)
Dept: FAMILY MEDICINE CLINIC | Facility: CLINIC | Age: 27
End: 2022-01-03

## 2022-01-03 DIAGNOSIS — R51.9 HEADACHE, UNSPECIFIED HEADACHE TYPE: ICD-10-CM

## 2022-01-03 DIAGNOSIS — R05.9 COUGH: ICD-10-CM

## 2022-01-03 DIAGNOSIS — U07.1 COVID-19 VIRUS INFECTION: Primary | ICD-10-CM

## 2022-01-03 DIAGNOSIS — M79.10 MYALGIA: ICD-10-CM

## 2022-01-03 PROCEDURE — 99213 OFFICE O/P EST LOW 20 MIN: CPT | Performed by: FAMILY MEDICINE

## 2022-01-03 RX ORDER — DEXTROMETHORPHAN HYDROBROMIDE AND PROMETHAZINE HYDROCHLORIDE 15; 6.25 MG/5ML; MG/5ML
5 SYRUP ORAL 4 TIMES DAILY PRN
Qty: 240 ML | Refills: 0 | Status: SHIPPED | OUTPATIENT
Start: 2022-01-03 | End: 2022-02-04

## 2022-01-03 RX ORDER — IBUPROFEN 600 MG/1
600 TABLET ORAL EVERY 8 HOURS PRN
Qty: 30 TABLET | Refills: 0 | Status: SHIPPED | OUTPATIENT
Start: 2022-01-03 | End: 2022-01-13

## 2022-01-03 NOTE — PROGRESS NOTES
VIDEO VISIT PROGRESS NOTE  Todays date: 1/3/2022 8:59 AM      Most recent Nurse Triage message / Parish Kearns message from patient:      Khanh Mei RN   Registered Nurse   Specialty:  Registered Nurse   Telephone Encounter   Signed   Encounter Date:  1/3/2 visit.  Note, many times the patient however is nearby and I can hear the patient answering questions while the person on the video with me is talking to the patient.     This also goes for family members who would rather speak to me on behalf of their Span Wheezing []   • Sore throat:     Yes []      No [x]      • Headache:      Yes [x]     No []      • Chest pain:   Yes []     No [x]      • Other symptoms:   • Runny Nose Yes []     No [x]     • Stuffy Nose Yes []     No [x]     • Post Nasal Drip Yes [] pain/inflammation). Referrals (if applicable)  No orders of the defined types were placed in this encounter. Follow up if symptoms persist.  Take medicine (if given) as prescribed.   Approach to treatment discussed and patient/family member un treatment as outlined on CDC Patient Guidelines. Gibranrashmi Nath understands video evaluation is not a substitute for face-to-face examination or emergency care. Patient advised to go to ER or call 911 for worsening symptoms or acute distress.  (NOTE: Not ev

## 2022-01-03 NOTE — TELEPHONE ENCOUNTER
Spoke to patient. She went for a rapid test yesterday and it came back positive. Updated Chart. She was seen in office earlier in December for bronchitis and she has a cough again with yellow sputum.  She gets chest tightness like she had earlier in the

## 2022-01-07 ENCOUNTER — TELEPHONE (OUTPATIENT)
Dept: FAMILY MEDICINE CLINIC | Facility: CLINIC | Age: 27
End: 2022-01-07

## 2022-01-07 RX ORDER — AZITHROMYCIN 250 MG/1
TABLET, FILM COATED ORAL
Qty: 6 TABLET | Refills: 0 | Status: SHIPPED | OUTPATIENT
Start: 2022-01-07 | End: 2022-01-12

## 2022-01-07 NOTE — TELEPHONE ENCOUNTER
Patient had telemed on 1/3/22. She is positive covid 1/2/22. She has a \"bad cough\" and is requested a medication she was prescribed on 12/6/21. She was prescribed:  promethazine-dextromethorphan 6.25-15 MG/5ML on 1/3/22, but says \"it's not helping. \" Ad

## 2022-01-31 ENCOUNTER — HOSPITAL ENCOUNTER (OUTPATIENT)
Age: 27
Discharge: ANOTHER HOSPITAL | End: 2022-01-31
Payer: COMMERCIAL

## 2022-01-31 ENCOUNTER — HOSPITAL ENCOUNTER (EMERGENCY)
Facility: HOSPITAL | Age: 27
Discharge: HOME | End: 2022-01-31
Payer: COMMERCIAL

## 2022-01-31 ENCOUNTER — APPOINTMENT (OUTPATIENT)
Dept: GENERAL RADIOLOGY | Facility: HOSPITAL | Age: 27
End: 2022-01-31
Attending: NURSE PRACTITIONER
Payer: COMMERCIAL

## 2022-01-31 ENCOUNTER — TELEPHONE (OUTPATIENT)
Dept: FAMILY MEDICINE CLINIC | Facility: CLINIC | Age: 27
End: 2022-01-31

## 2022-01-31 VITALS
SYSTOLIC BLOOD PRESSURE: 172 MMHG | HEART RATE: 81 BPM | OXYGEN SATURATION: 100 % | TEMPERATURE: 98 F | DIASTOLIC BLOOD PRESSURE: 90 MMHG | RESPIRATION RATE: 20 BRPM

## 2022-01-31 VITALS
HEIGHT: 66 IN | WEIGHT: 156 LBS | BODY MASS INDEX: 25.07 KG/M2 | HEART RATE: 84 BPM | SYSTOLIC BLOOD PRESSURE: 132 MMHG | RESPIRATION RATE: 16 BRPM | DIASTOLIC BLOOD PRESSURE: 86 MMHG | TEMPERATURE: 98 F | OXYGEN SATURATION: 100 %

## 2022-01-31 DIAGNOSIS — U07.1 COVID-19: Primary | ICD-10-CM

## 2022-01-31 DIAGNOSIS — R53.1 WEAKNESS: ICD-10-CM

## 2022-01-31 DIAGNOSIS — R25.1 TREMOR: Primary | ICD-10-CM

## 2022-01-31 DIAGNOSIS — R25.1 OCCASIONAL TREMORS: ICD-10-CM

## 2022-01-31 PROCEDURE — 99283 EMERGENCY DEPT VISIT LOW MDM: CPT

## 2022-01-31 PROCEDURE — 71045 X-RAY EXAM CHEST 1 VIEW: CPT | Performed by: NURSE PRACTITIONER

## 2022-01-31 PROCEDURE — 99214 OFFICE O/P EST MOD 30 MIN: CPT | Performed by: NURSE PRACTITIONER

## 2022-01-31 RX ORDER — ALBUTEROL SULFATE 90 UG/1
2 AEROSOL, METERED RESPIRATORY (INHALATION) EVERY 4 HOURS PRN
Qty: 1 EACH | Refills: 0 | Status: SHIPPED | OUTPATIENT
Start: 2022-01-31 | End: 2022-03-02

## 2022-01-31 NOTE — ED INITIAL ASSESSMENT (HPI)
Pt reports cough, phlegm, and nasal congestion for the past 3 days. Pt reports \"whole body shaking\" while conscious intemrittently throughout the day. Pt c/o SOB. Pt reports hx of anxiety, but reports this is an atypical anxiety presentation for her. Pt presents AOx4, speaking in complete sentences. pt denies fevers.

## 2022-01-31 NOTE — ED QUICK NOTES
Patient discharged home in no acute distress. A&Ox4, skin p/w/d, denies cp/sob. Ambulating with steady gait and verbalized understanding of d/c instructions and prescriptions. Patient was provided with Incentive Spirometry , and instructed on use. Patient verbalized understanding.

## 2022-01-31 NOTE — TELEPHONE ENCOUNTER
Patient states for the last 3 days she's been feeling worse like she was when diagnosed with Covid. States this feels like she has bronchitis again - coughing frequently, congestion, tightness in chest, yellow phlegm and chills.   States she's feeling wors

## 2022-01-31 NOTE — ED INITIAL ASSESSMENT (HPI)
Pt here with complaints of cough and congestion that started 3 days ago , pt states she is coughing up brown/yellow sputum , pt also states she began trembling today at 9am, pt denies any fevers

## 2022-01-31 NOTE — ED QUICK NOTES
Pt instructed by NP to go to the ER for further evaluation on tremors , pt states she is going to [address] [place] [address] via car with

## 2022-02-04 ENCOUNTER — OFFICE VISIT (OUTPATIENT)
Dept: FAMILY MEDICINE CLINIC | Facility: CLINIC | Age: 27
End: 2022-02-04
Payer: COMMERCIAL

## 2022-02-04 VITALS
HEART RATE: 80 BPM | BODY MASS INDEX: 24.91 KG/M2 | WEIGHT: 155 LBS | HEIGHT: 66 IN | DIASTOLIC BLOOD PRESSURE: 90 MMHG | SYSTOLIC BLOOD PRESSURE: 148 MMHG

## 2022-02-04 DIAGNOSIS — R03.0 ELEVATED BLOOD PRESSURE READING IN OFFICE WITHOUT DIAGNOSIS OF HYPERTENSION: ICD-10-CM

## 2022-02-04 DIAGNOSIS — R10.11 RIGHT UPPER QUADRANT PAIN: Primary | ICD-10-CM

## 2022-02-04 PROCEDURE — 3077F SYST BP >= 140 MM HG: CPT | Performed by: NURSE PRACTITIONER

## 2022-02-04 PROCEDURE — 99213 OFFICE O/P EST LOW 20 MIN: CPT | Performed by: NURSE PRACTITIONER

## 2022-02-04 PROCEDURE — 3008F BODY MASS INDEX DOCD: CPT | Performed by: NURSE PRACTITIONER

## 2022-02-04 PROCEDURE — 3080F DIAST BP >= 90 MM HG: CPT | Performed by: NURSE PRACTITIONER

## 2022-02-10 ENCOUNTER — HOSPITAL ENCOUNTER (OUTPATIENT)
Dept: ULTRASOUND IMAGING | Age: 27
Discharge: HOME | End: 2022-02-10
Attending: NURSE PRACTITIONER
Payer: COMMERCIAL

## 2022-02-10 DIAGNOSIS — R10.11 RIGHT UPPER QUADRANT PAIN: ICD-10-CM

## 2022-02-10 PROCEDURE — 76700 US EXAM ABDOM COMPLETE: CPT | Performed by: NURSE PRACTITIONER

## 2022-02-13 ENCOUNTER — PATIENT MESSAGE (OUTPATIENT)
Dept: FAMILY MEDICINE CLINIC | Facility: CLINIC | Age: 27
End: 2022-02-13

## 2022-02-14 ENCOUNTER — TELEPHONE (OUTPATIENT)
Dept: FAMILY MEDICINE CLINIC | Facility: CLINIC | Age: 27
End: 2022-02-14

## 2022-02-15 ENCOUNTER — HOSPITAL ENCOUNTER (EMERGENCY)
Facility: HOSPITAL | Age: 27
Discharge: HOME | End: 2022-02-15
Payer: COMMERCIAL

## 2022-02-15 ENCOUNTER — APPOINTMENT (OUTPATIENT)
Dept: CT IMAGING | Facility: HOSPITAL | Age: 27
End: 2022-02-15
Attending: NURSE PRACTITIONER
Payer: COMMERCIAL

## 2022-02-15 VITALS
DIASTOLIC BLOOD PRESSURE: 86 MMHG | HEART RATE: 84 BPM | BODY MASS INDEX: 25 KG/M2 | SYSTOLIC BLOOD PRESSURE: 138 MMHG | WEIGHT: 153 LBS | RESPIRATION RATE: 16 BRPM | OXYGEN SATURATION: 98 % | TEMPERATURE: 98 F

## 2022-02-15 DIAGNOSIS — R10.11 RUQ PAIN: Primary | ICD-10-CM

## 2022-02-15 LAB
ALBUMIN SERPL-MCNC: 3.9 G/DL (ref 3.4–5)
ALP LIVER SERPL-CCNC: 44 U/L
ALT SERPL-CCNC: 22 U/L
ANION GAP SERPL CALC-SCNC: 4 MMOL/L (ref 0–18)
BASOPHILS # BLD AUTO: 0.08 X10(3) UL (ref 0–0.2)
BASOPHILS NFR BLD AUTO: 0.8 %
BILIRUB DIRECT SERPL-MCNC: 0.2 MG/DL (ref 0–0.2)
BILIRUB SERPL-MCNC: 0.5 MG/DL (ref 0.1–2)
BILIRUB UR QL: NEGATIVE
BUN BLD-MCNC: 6 MG/DL (ref 7–18)
BUN/CREAT SERPL: 8.1 (ref 10–20)
CALCIUM BLD-MCNC: 9.3 MG/DL (ref 8.5–10.1)
CHLORIDE SERPL-SCNC: 106 MMOL/L (ref 98–112)
CO2 SERPL-SCNC: 27 MMOL/L (ref 21–32)
COLOR UR: YELLOW
CREAT BLD-MCNC: 0.74 MG/DL
D DIMER PPP FEU-MCNC: 0.31 UG/ML FEU (ref ?–0.5)
DEPRECATED RDW RBC AUTO: 40.8 FL (ref 35.1–46.3)
EOSINOPHIL # BLD AUTO: 0.53 X10(3) UL (ref 0–0.7)
EOSINOPHIL NFR BLD AUTO: 5.4 %
ERYTHROCYTE [DISTWIDTH] IN BLOOD BY AUTOMATED COUNT: 12.8 % (ref 11–15)
GLUCOSE BLD-MCNC: 103 MG/DL (ref 70–99)
GLUCOSE UR-MCNC: NEGATIVE MG/DL
HCT VFR BLD AUTO: 41.3 %
HGB BLD-MCNC: 13.2 G/DL
HGB UR QL STRIP.AUTO: NEGATIVE
IMM GRANULOCYTES # BLD AUTO: 0.04 X10(3) UL (ref 0–1)
IMM GRANULOCYTES NFR BLD: 0.4 %
KETONES UR-MCNC: NEGATIVE MG/DL
LIPASE SERPL-CCNC: 64 U/L (ref 73–393)
LYMPHOCYTES # BLD AUTO: 1.91 X10(3) UL (ref 1–4)
LYMPHOCYTES NFR BLD AUTO: 19.5 %
MCH RBC QN AUTO: 27.7 PG (ref 26–34)
MCHC RBC AUTO-ENTMCNC: 32 G/DL (ref 31–37)
MCV RBC AUTO: 86.8 FL
MONOCYTES # BLD AUTO: 0.6 X10(3) UL (ref 0.1–1)
MONOCYTES NFR BLD AUTO: 6.1 %
NEUTROPHILS # BLD AUTO: 6.65 X10 (3) UL (ref 1.5–7.7)
NEUTROPHILS # BLD AUTO: 6.65 X10(3) UL (ref 1.5–7.7)
NEUTROPHILS NFR BLD AUTO: 67.8 %
NITRITE UR QL STRIP.AUTO: NEGATIVE
OSMOLALITY SERPL CALC.SUM OF ELEC: 282 MOSM/KG (ref 275–295)
PH UR: 8 [PH] (ref 5–8)
PLATELET # BLD AUTO: 271 10(3)UL (ref 150–450)
POTASSIUM SERPL-SCNC: 3.8 MMOL/L (ref 3.5–5.1)
PROT SERPL-MCNC: 7.2 G/DL (ref 6.4–8.2)
PROT UR-MCNC: NEGATIVE MG/DL
RBC # BLD AUTO: 4.76 X10(6)UL
SODIUM SERPL-SCNC: 137 MMOL/L (ref 136–145)
SP GR UR STRIP: 1.02 (ref 1–1.03)
UROBILINOGEN UR STRIP-ACNC: <2
WBC # BLD AUTO: 9.8 X10(3) UL (ref 4–11)

## 2022-02-15 PROCEDURE — 85025 COMPLETE CBC W/AUTO DIFF WBC: CPT

## 2022-02-15 PROCEDURE — 74176 CT ABD & PELVIS W/O CONTRAST: CPT | Performed by: NURSE PRACTITIONER

## 2022-02-15 PROCEDURE — 85379 FIBRIN DEGRADATION QUANT: CPT | Performed by: NURSE PRACTITIONER

## 2022-02-15 PROCEDURE — 81001 URINALYSIS AUTO W/SCOPE: CPT

## 2022-02-15 PROCEDURE — 96374 THER/PROPH/DIAG INJ IV PUSH: CPT

## 2022-02-15 PROCEDURE — 83690 ASSAY OF LIPASE: CPT | Performed by: NURSE PRACTITIONER

## 2022-02-15 PROCEDURE — 80076 HEPATIC FUNCTION PANEL: CPT | Performed by: NURSE PRACTITIONER

## 2022-02-15 PROCEDURE — 96361 HYDRATE IV INFUSION ADD-ON: CPT

## 2022-02-15 PROCEDURE — 80048 BASIC METABOLIC PNL TOTAL CA: CPT

## 2022-02-15 PROCEDURE — 87086 URINE CULTURE/COLONY COUNT: CPT

## 2022-02-15 PROCEDURE — 99284 EMERGENCY DEPT VISIT MOD MDM: CPT

## 2022-02-15 RX ORDER — KETOROLAC TROMETHAMINE 15 MG/ML
15 INJECTION, SOLUTION INTRAMUSCULAR; INTRAVENOUS ONCE
Status: COMPLETED | OUTPATIENT
Start: 2022-02-15 | End: 2022-02-15

## 2022-02-15 NOTE — ED INITIAL ASSESSMENT (HPI)
Patient complains of right upper abdominal pain x 1 week. Had negative US last Thursday, here today per pain worse and told to come in if persistent. associated with intermittent nausea.

## 2022-02-22 ENCOUNTER — HOSPITAL ENCOUNTER (OUTPATIENT)
Dept: NUCLEAR MEDICINE | Facility: HOSPITAL | Age: 27
Discharge: HOME | End: 2022-02-22
Attending: NURSE PRACTITIONER
Payer: COMMERCIAL

## 2022-02-22 DIAGNOSIS — R10.11 RUQ PAIN: ICD-10-CM

## 2022-02-22 PROCEDURE — 78227 HEPATOBIL SYST IMAGE W/DRUG: CPT | Performed by: NURSE PRACTITIONER

## 2022-02-23 ENCOUNTER — TELEPHONE (OUTPATIENT)
Dept: FAMILY MEDICINE CLINIC | Facility: CLINIC | Age: 27
End: 2022-02-23

## 2022-02-23 PROBLEM — K82.9 GALLBLADDER DISORDER: Status: ACTIVE | Noted: 2022-02-23

## 2022-02-23 NOTE — TELEPHONE ENCOUNTER
Pt calling to follow-up on symptoms. States she is still having pain 7/10. Pt states she tried eating, then pain occurs. Pt states she does not want to keep taking Tylenol for pain. Pt had HIDA scan yesterday, viewed results in [employer] and asking for further recommendations. Pt aware results are released automatically in [employer], and Everette Carroll has not viewed results as of this afternoon. Informed Pt to watch for result notes in [address] Pt verbalized understanding.

## 2022-02-24 ENCOUNTER — OFFICE VISIT (OUTPATIENT)
Dept: SURGERY | Facility: CLINIC | Age: 27
End: 2022-02-24
Payer: COMMERCIAL

## 2022-02-24 VITALS — BODY MASS INDEX: 24.59 KG/M2 | WEIGHT: 153 LBS | HEIGHT: 66 IN

## 2022-02-24 DIAGNOSIS — K82.8 BILIARY DYSKINESIA: Primary | ICD-10-CM

## 2022-02-24 PROCEDURE — 99204 OFFICE O/P NEW MOD 45 MIN: CPT | Performed by: SURGERY

## 2022-02-24 PROCEDURE — 3008F BODY MASS INDEX DOCD: CPT | Performed by: SURGERY

## 2022-02-24 RX ORDER — PROPRANOLOL HCL 60 MG
1 CAPSULE, EXTENDED RELEASE 24HR ORAL DAILY
COMMUNITY
Start: 2022-02-06

## 2022-02-25 ENCOUNTER — HOSPITAL ENCOUNTER (OUTPATIENT)
Facility: HOSPITAL | Age: 27
Setting detail: HOSPITAL OUTPATIENT SURGERY
Discharge: HOME | End: 2022-02-25
Attending: SURGERY | Admitting: SURGERY
Payer: COMMERCIAL

## 2022-02-25 ENCOUNTER — ANESTHESIA EVENT (OUTPATIENT)
Dept: SURGERY | Facility: HOSPITAL | Age: 27
End: 2022-02-25
Payer: COMMERCIAL

## 2022-02-25 ENCOUNTER — ANESTHESIA (OUTPATIENT)
Dept: SURGERY | Facility: HOSPITAL | Age: 27
End: 2022-02-25
Payer: COMMERCIAL

## 2022-02-25 VITALS
TEMPERATURE: 98 F | HEART RATE: 59 BPM | SYSTOLIC BLOOD PRESSURE: 137 MMHG | RESPIRATION RATE: 16 BRPM | DIASTOLIC BLOOD PRESSURE: 88 MMHG | OXYGEN SATURATION: 98 %

## 2022-02-25 DIAGNOSIS — K82.8 BILIARY DYSKINESIA: ICD-10-CM

## 2022-02-25 LAB — B-HCG UR QL: NEGATIVE

## 2022-02-25 PROCEDURE — 47562 LAPAROSCOPIC CHOLECYSTECTOMY: CPT | Performed by: SURGERY

## 2022-02-25 PROCEDURE — 0FT44ZZ RESECTION OF GALLBLADDER, PERCUTANEOUS ENDOSCOPIC APPROACH: ICD-10-PCS | Performed by: SURGERY

## 2022-02-25 PROCEDURE — 0WQF4ZZ REPAIR ABDOMINAL WALL, PERCUTANEOUS ENDOSCOPIC APPROACH: ICD-10-PCS | Performed by: SURGERY

## 2022-02-25 RX ORDER — MORPHINE SULFATE 4 MG/ML
4 INJECTION, SOLUTION INTRAMUSCULAR; INTRAVENOUS EVERY 10 MIN PRN
Status: DISCONTINUED | OUTPATIENT
Start: 2022-02-25 | End: 2022-02-25

## 2022-02-25 RX ORDER — ONDANSETRON 2 MG/ML
INJECTION INTRAMUSCULAR; INTRAVENOUS AS NEEDED
Status: DISCONTINUED | OUTPATIENT
Start: 2022-02-25 | End: 2022-02-25 | Stop reason: SURG

## 2022-02-25 RX ORDER — HYDROMORPHONE HYDROCHLORIDE 1 MG/ML
0.2 INJECTION, SOLUTION INTRAMUSCULAR; INTRAVENOUS; SUBCUTANEOUS EVERY 5 MIN PRN
Status: DISCONTINUED | OUTPATIENT
Start: 2022-02-25 | End: 2022-02-25

## 2022-02-25 RX ORDER — HYDROCODONE BITARTRATE AND ACETAMINOPHEN 5; 325 MG/1; MG/1
2 TABLET ORAL AS NEEDED
Status: COMPLETED | OUTPATIENT
Start: 2022-02-25 | End: 2022-02-25

## 2022-02-25 RX ORDER — MIDAZOLAM HYDROCHLORIDE 1 MG/ML
INJECTION INTRAMUSCULAR; INTRAVENOUS AS NEEDED
Status: DISCONTINUED | OUTPATIENT
Start: 2022-02-25 | End: 2022-02-25 | Stop reason: SURG

## 2022-02-25 RX ORDER — BUPIVACAINE HYDROCHLORIDE 5 MG/ML
INJECTION, SOLUTION EPIDURAL; INTRACAUDAL AS NEEDED
Status: DISCONTINUED | OUTPATIENT
Start: 2022-02-25 | End: 2022-02-25 | Stop reason: HOSPADM

## 2022-02-25 RX ORDER — HALOPERIDOL 5 MG/ML
0.25 INJECTION INTRAMUSCULAR ONCE AS NEEDED
Status: DISCONTINUED | OUTPATIENT
Start: 2022-02-25 | End: 2022-02-25

## 2022-02-25 RX ORDER — POLYETHYLENE GLYCOL 3350 17 G/17G
17 POWDER, FOR SOLUTION ORAL DAILY
Qty: 14 PACKET | Refills: 0 | Status: SHIPPED | OUTPATIENT
Start: 2022-02-25 | End: 2022-03-11

## 2022-02-25 RX ORDER — ROCURONIUM BROMIDE 10 MG/ML
INJECTION, SOLUTION INTRAVENOUS AS NEEDED
Status: DISCONTINUED | OUTPATIENT
Start: 2022-02-25 | End: 2022-02-25 | Stop reason: SURG

## 2022-02-25 RX ORDER — DEXAMETHASONE SODIUM PHOSPHATE 4 MG/ML
VIAL (ML) INJECTION AS NEEDED
Status: DISCONTINUED | OUTPATIENT
Start: 2022-02-25 | End: 2022-02-25 | Stop reason: SURG

## 2022-02-25 RX ORDER — HYDROMORPHONE HYDROCHLORIDE 1 MG/ML
0.4 INJECTION, SOLUTION INTRAMUSCULAR; INTRAVENOUS; SUBCUTANEOUS EVERY 5 MIN PRN
Status: DISCONTINUED | OUTPATIENT
Start: 2022-02-25 | End: 2022-02-25

## 2022-02-25 RX ORDER — GLYCOPYRROLATE 0.2 MG/ML
INJECTION, SOLUTION INTRAMUSCULAR; INTRAVENOUS AS NEEDED
Status: DISCONTINUED | OUTPATIENT
Start: 2022-02-25 | End: 2022-02-25 | Stop reason: SURG

## 2022-02-25 RX ORDER — ONDANSETRON 4 MG/1
4 TABLET, FILM COATED ORAL EVERY 8 HOURS PRN
Qty: 15 TABLET | Refills: 0 | Status: SHIPPED | OUTPATIENT
Start: 2022-02-25

## 2022-02-25 RX ORDER — LIDOCAINE HYDROCHLORIDE 10 MG/ML
INJECTION, SOLUTION EPIDURAL; INFILTRATION; INTRACAUDAL; PERINEURAL AS NEEDED
Status: DISCONTINUED | OUTPATIENT
Start: 2022-02-25 | End: 2022-02-25 | Stop reason: SURG

## 2022-02-25 RX ORDER — NEOSTIGMINE METHYLSULFATE 1 MG/ML
INJECTION INTRAVENOUS AS NEEDED
Status: DISCONTINUED | OUTPATIENT
Start: 2022-02-25 | End: 2022-02-25 | Stop reason: SURG

## 2022-02-25 RX ORDER — HYDROMORPHONE HYDROCHLORIDE 1 MG/ML
0.6 INJECTION, SOLUTION INTRAMUSCULAR; INTRAVENOUS; SUBCUTANEOUS EVERY 5 MIN PRN
Status: DISCONTINUED | OUTPATIENT
Start: 2022-02-25 | End: 2022-02-25

## 2022-02-25 RX ORDER — HYDROCODONE BITARTRATE AND ACETAMINOPHEN 5; 325 MG/1; MG/1
1 TABLET ORAL EVERY 6 HOURS PRN
Qty: 30 TABLET | Refills: 0 | Status: SHIPPED | OUTPATIENT
Start: 2022-02-25

## 2022-02-25 RX ORDER — SODIUM CHLORIDE, SODIUM LACTATE, POTASSIUM CHLORIDE, CALCIUM CHLORIDE 600; 310; 30; 20 MG/100ML; MG/100ML; MG/100ML; MG/100ML
INJECTION, SOLUTION INTRAVENOUS CONTINUOUS
Status: DISCONTINUED | OUTPATIENT
Start: 2022-02-25 | End: 2022-02-25

## 2022-02-25 RX ORDER — PROCHLORPERAZINE EDISYLATE 5 MG/ML
5 INJECTION INTRAMUSCULAR; INTRAVENOUS ONCE AS NEEDED
Status: COMPLETED | OUTPATIENT
Start: 2022-02-25 | End: 2022-02-25

## 2022-02-25 RX ORDER — CEFAZOLIN SODIUM/WATER 2 G/20 ML
2 SYRINGE (ML) INTRAVENOUS ONCE
Status: DISCONTINUED | OUTPATIENT
Start: 2022-02-25 | End: 2022-02-25 | Stop reason: HOSPADM

## 2022-02-25 RX ORDER — NALOXONE HYDROCHLORIDE 0.4 MG/ML
80 INJECTION, SOLUTION INTRAMUSCULAR; INTRAVENOUS; SUBCUTANEOUS AS NEEDED
Status: DISCONTINUED | OUTPATIENT
Start: 2022-02-25 | End: 2022-02-25

## 2022-02-25 RX ORDER — METOPROLOL TARTRATE 5 MG/5ML
2.5 INJECTION INTRAVENOUS ONCE
Status: DISCONTINUED | OUTPATIENT
Start: 2022-02-25 | End: 2022-02-25

## 2022-02-25 RX ORDER — HYDROCODONE BITARTRATE AND ACETAMINOPHEN 5; 325 MG/1; MG/1
1 TABLET ORAL AS NEEDED
Status: COMPLETED | OUTPATIENT
Start: 2022-02-25 | End: 2022-02-25

## 2022-02-25 RX ORDER — ONDANSETRON 2 MG/ML
4 INJECTION INTRAMUSCULAR; INTRAVENOUS ONCE AS NEEDED
Status: COMPLETED | OUTPATIENT
Start: 2022-02-25 | End: 2022-02-25

## 2022-02-25 RX ORDER — ACETAMINOPHEN 500 MG
1000 TABLET ORAL ONCE
Status: COMPLETED | OUTPATIENT
Start: 2022-02-25 | End: 2022-02-25

## 2022-02-25 RX ORDER — MORPHINE SULFATE 4 MG/ML
2 INJECTION, SOLUTION INTRAMUSCULAR; INTRAVENOUS EVERY 10 MIN PRN
Status: DISCONTINUED | OUTPATIENT
Start: 2022-02-25 | End: 2022-02-25

## 2022-02-25 RX ORDER — MORPHINE SULFATE 10 MG/ML
6 INJECTION, SOLUTION INTRAMUSCULAR; INTRAVENOUS EVERY 10 MIN PRN
Status: DISCONTINUED | OUTPATIENT
Start: 2022-02-25 | End: 2022-02-25

## 2022-02-25 RX ADMIN — SODIUM CHLORIDE, SODIUM LACTATE, POTASSIUM CHLORIDE, CALCIUM CHLORIDE: 600; 310; 30; 20 INJECTION, SOLUTION INTRAVENOUS at 13:57:00

## 2022-02-25 RX ADMIN — NEOSTIGMINE METHYLSULFATE 3 MG: 1 INJECTION INTRAVENOUS at 13:43:00

## 2022-02-25 RX ADMIN — GLYCOPYRROLATE 0.4 MG: 0.2 INJECTION, SOLUTION INTRAMUSCULAR; INTRAVENOUS at 13:43:00

## 2022-02-25 RX ADMIN — ROCURONIUM BROMIDE 40 MG: 10 INJECTION, SOLUTION INTRAVENOUS at 13:09:00

## 2022-02-25 RX ADMIN — MIDAZOLAM HYDROCHLORIDE 2 MG: 1 INJECTION INTRAMUSCULAR; INTRAVENOUS at 13:03:00

## 2022-02-25 RX ADMIN — LIDOCAINE HYDROCHLORIDE 50 MG: 10 INJECTION, SOLUTION EPIDURAL; INFILTRATION; INTRACAUDAL; PERINEURAL at 13:09:00

## 2022-02-25 RX ADMIN — DEXAMETHASONE SODIUM PHOSPHATE 4 MG: 4 MG/ML VIAL (ML) INJECTION at 13:20:00

## 2022-02-25 RX ADMIN — ONDANSETRON 4 MG: 2 INJECTION INTRAMUSCULAR; INTRAVENOUS at 13:20:00

## 2022-02-25 NOTE — ANESTHESIA PROCEDURE NOTES
Airway  Date/Time: 2/25/2022 1:10 PM    General Information and Staff    Patient location during procedure: OR  Anesthesiologist: Chico Carrizales MD  Resident/CRNA: Jassi Pimentel CRNA  Performed: CRNA     Indications and Patient Condition  Indications for airway management: anesthesia  Spontaneous ventilation: present  Sedation level: deep  Preoxygenated: yes  Patient position: sniffing  Mask difficulty assessment: 1 - vent by mask    Final Airway Details  Final airway type: endotracheal airway      Successful airway: ETT  Cuffed: yes   Successful intubation technique: direct laryngoscopy  Facilitating devices/methods: intubating stylet  Blade: Dieudonne  Blade size: #3  ETT size (mm): 7.0    Cormack-Lehane Classification: grade I - full view of glottis  Placement verified by: chest auscultation and capnometry   Cuff volume (mL): 8  Measured from: teeth  ETT to teeth (cm): 22  Number of attempts at approach: 1

## 2022-02-25 NOTE — INTERVAL H&P NOTE
Pre-op Diagnosis: Biliary dyskinesia [K82.8]    The above referenced H&P was reviewed by Yemi Dobbins MD on 2/25/2022, the patient was examined and no significant changes have occurred in the patient's condition since the H&P was performed. I discussed with the patient and/or legal representative the potential benefits, risks and side effects of this procedure; the likelihood of the patient achieving goals; and potential problems that might occur during recuperation. I discussed reasonable alternatives to the procedure, including risks, benefits and side effects related to the alternatives and risks related to not receiving this procedure. We will proceed with procedure as planned.

## 2022-02-25 NOTE — OPERATIVE REPORT
Operative Report    Patient Name:  Ingrid Nath  MR:  [id number]  :  6/10/1995  DOS:  23    Preop Dx:  Biliary dyskinesia [K82.8]  Postop Dx:  Biliary dyskinesia [W37.6], umbilical hernia   Procedure:    1. Laparoscopic cholecystectomy  2. Umbilical hernia repair  Surgeon:  Estevan Shah MD  Surgical Assistant.: Cristal Merida CSA  EBL: 5 ml  Complication:  None    INDICATION:  Pt is a 23year old female who with Biliary dyskinesia [K82.8] who is scheduled for a LAPAROSCOPIC CHOLECYSTECTOMY, umbilical hernia repair. CONSENT:  An informed consent discussion was held with the patient regarding the nature of Biliary dyskinesia [K82.8], the treatment options and the details of the procedure. The risks including but not limited to bleeding, wound infection, intra-abdominal infection, injury to the liver, colon, small intestine, pancreas, stomach, common bile duct, incomplete resection, cystic duct stump leak, retained stone and incisional hernia were discussed. The patient expressed understanding and want to proceed with the planned procedure. TECHNIQUE:  The patient was taken to the OR and placed in supine position. General anesthesia was established and the abdomen was prepped in standard fashion. Pneumoperitoneum was obtained using open technique through a supra-umbilical incision. A 12-mm trocar was inserted under direct visualization and no injury occurred. Examination of the abdomen showed a normal appearing gallbladder consistent with Biliary dyskinesia [K82.8]. Three 5-mm trocars were placed in the epigastric and right abdomen. The patient was placed in reverse Trendelenburg position. The fundus was grasped and retracted cephalad. The infundibulum was grasped and retracted inferior, anterior, and to the right. The peritoneum along the medial and lateral aspect of the gallbladder/liver edge were incised using hook cautery. The lower 1/3 of the gallbladder was dissected from the liver. Two structures, identified as the cystic duct and artery, are seen entering the infundibulum, thus obtaining the so called \"critical view of safety\". The cystic duct and artery were clipped and divided. The gallbladder was detached from the liver using hook cautery and delivered from the abdomen using an endocatch bag. The abdominal cavity was irrigated with saline and found to be hemostatic. The trocars were removed under direct visualization and no port site bleeding was seen. The supra-umbilical fascia was closed using 0 vicryl. We saw a tiny umbilical hernia with herniated preperitoneal fat nearby. I excised the herniated fat and repaired the umbilical hernia primarily. The skin incisions were closed using 4-0 vicryl. Sterile dressings were applied. All instrument and sponge counts were correct. I was present during the critical portions of the procedure.     Prakash Rodriguez MD

## 2022-04-04 ENCOUNTER — HOSPITAL ENCOUNTER (EMERGENCY)
Facility: HOSPITAL | Age: 27
Discharge: HOME | End: 2022-04-04
Attending: EMERGENCY MEDICINE
Payer: COMMERCIAL

## 2022-04-04 ENCOUNTER — APPOINTMENT (OUTPATIENT)
Dept: CT IMAGING | Facility: HOSPITAL | Age: 27
End: 2022-04-04
Attending: EMERGENCY MEDICINE
Payer: COMMERCIAL

## 2022-04-04 ENCOUNTER — APPOINTMENT (OUTPATIENT)
Dept: MRI IMAGING | Facility: HOSPITAL | Age: 27
End: 2022-04-04
Attending: EMERGENCY MEDICINE
Payer: COMMERCIAL

## 2022-04-04 VITALS
OXYGEN SATURATION: 99 % | SYSTOLIC BLOOD PRESSURE: 130 MMHG | WEIGHT: 145 LBS | RESPIRATION RATE: 20 BRPM | BODY MASS INDEX: 23 KG/M2 | HEART RATE: 68 BPM | TEMPERATURE: 98 F | DIASTOLIC BLOOD PRESSURE: 84 MMHG

## 2022-04-04 DIAGNOSIS — M54.2 NECK PAIN: Primary | ICD-10-CM

## 2022-04-04 LAB
ANION GAP SERPL CALC-SCNC: 4 MMOL/L (ref 0–18)
B-HCG UR QL: NEGATIVE
BASOPHILS # BLD AUTO: 0.1 X10(3) UL (ref 0–0.2)
BASOPHILS NFR BLD AUTO: 1.1 %
BUN BLD-MCNC: 8 MG/DL (ref 7–18)
BUN/CREAT SERPL: 9.3 (ref 10–20)
CALCIUM BLD-MCNC: 8.7 MG/DL (ref 8.5–10.1)
CHLORIDE SERPL-SCNC: 107 MMOL/L (ref 98–112)
CO2 SERPL-SCNC: 28 MMOL/L (ref 21–32)
CREAT BLD-MCNC: 0.86 MG/DL
DEPRECATED RDW RBC AUTO: 40.5 FL (ref 35.1–46.3)
EOSINOPHIL # BLD AUTO: 0.33 X10(3) UL (ref 0–0.7)
EOSINOPHIL NFR BLD AUTO: 3.8 %
ERYTHROCYTE [DISTWIDTH] IN BLOOD BY AUTOMATED COUNT: 12.5 % (ref 11–15)
GLUCOSE BLD-MCNC: 96 MG/DL (ref 70–99)
HCT VFR BLD AUTO: 39.1 %
HGB BLD-MCNC: 12.5 G/DL
IMM GRANULOCYTES # BLD AUTO: 0.03 X10(3) UL (ref 0–1)
IMM GRANULOCYTES NFR BLD: 0.3 %
LYMPHOCYTES # BLD AUTO: 1.79 X10(3) UL (ref 1–4)
LYMPHOCYTES NFR BLD AUTO: 20.5 %
MCH RBC QN AUTO: 28 PG (ref 26–34)
MCHC RBC AUTO-ENTMCNC: 32 G/DL (ref 31–37)
MCV RBC AUTO: 87.7 FL
MONOCYTES # BLD AUTO: 0.56 X10(3) UL (ref 0.1–1)
MONOCYTES NFR BLD AUTO: 6.4 %
NEUTROPHILS # BLD AUTO: 5.92 X10 (3) UL (ref 1.5–7.7)
NEUTROPHILS # BLD AUTO: 5.92 X10(3) UL (ref 1.5–7.7)
NEUTROPHILS NFR BLD AUTO: 67.9 %
OSMOLALITY SERPL CALC.SUM OF ELEC: 286 MOSM/KG (ref 275–295)
PLATELET # BLD AUTO: 203 10(3)UL (ref 150–450)
POTASSIUM SERPL-SCNC: 3.8 MMOL/L (ref 3.5–5.1)
RBC # BLD AUTO: 4.46 X10(6)UL
SODIUM SERPL-SCNC: 139 MMOL/L (ref 136–145)
WBC # BLD AUTO: 8.7 X10(3) UL (ref 4–11)

## 2022-04-04 PROCEDURE — 70553 MRI BRAIN STEM W/O & W/DYE: CPT | Performed by: EMERGENCY MEDICINE

## 2022-04-04 PROCEDURE — 93010 ELECTROCARDIOGRAM REPORT: CPT | Performed by: EMERGENCY MEDICINE

## 2022-04-04 PROCEDURE — 81025 URINE PREGNANCY TEST: CPT

## 2022-04-04 PROCEDURE — 99285 EMERGENCY DEPT VISIT HI MDM: CPT

## 2022-04-04 PROCEDURE — 85025 COMPLETE CBC W/AUTO DIFF WBC: CPT | Performed by: EMERGENCY MEDICINE

## 2022-04-04 PROCEDURE — 93005 ELECTROCARDIOGRAM TRACING: CPT

## 2022-04-04 PROCEDURE — 70450 CT HEAD/BRAIN W/O DYE: CPT | Performed by: EMERGENCY MEDICINE

## 2022-04-04 PROCEDURE — 96374 THER/PROPH/DIAG INJ IV PUSH: CPT

## 2022-04-04 PROCEDURE — 80048 BASIC METABOLIC PNL TOTAL CA: CPT | Performed by: EMERGENCY MEDICINE

## 2022-04-04 PROCEDURE — 81025 URINE PREGNANCY TEST: CPT | Performed by: EMERGENCY MEDICINE

## 2022-04-04 PROCEDURE — A9575 INJ GADOTERATE MEGLUMI 0.1ML: HCPCS | Performed by: EMERGENCY MEDICINE

## 2022-04-04 PROCEDURE — 70549 MR ANGIOGRAPH NECK W/O&W/DYE: CPT | Performed by: EMERGENCY MEDICINE

## 2022-04-04 PROCEDURE — 70544 MR ANGIOGRAPHY HEAD W/O DYE: CPT | Performed by: EMERGENCY MEDICINE

## 2022-04-04 RX ORDER — DIAZEPAM 5 MG/ML
5 INJECTION, SOLUTION INTRAMUSCULAR; INTRAVENOUS ONCE
Status: COMPLETED | OUTPATIENT
Start: 2022-04-04 | End: 2022-04-04

## 2022-04-04 NOTE — ED INITIAL ASSESSMENT (HPI)
Pt to ED for occipital head pressure after hearing a \"pop\" on Tuesday when she turned her head to the left, pt reports having trouble with stuttering and speech since Tuesday since then. Denies relief with tylenol/mnotrin/icc/heat. +nausea/vomiting on Friday. Denies vision changes. Denies numbness/tingling to extremities.

## 2022-04-05 ENCOUNTER — OFFICE VISIT (OUTPATIENT)
Dept: FAMILY MEDICINE CLINIC | Facility: CLINIC | Age: 27
End: 2022-04-05
Payer: COMMERCIAL

## 2022-04-05 VITALS
HEIGHT: 66 IN | BODY MASS INDEX: 23 KG/M2 | SYSTOLIC BLOOD PRESSURE: 128 MMHG | HEART RATE: 71 BPM | DIASTOLIC BLOOD PRESSURE: 82 MMHG

## 2022-04-05 DIAGNOSIS — R47.89 OTHER SPEECH DISTURBANCE: ICD-10-CM

## 2022-04-05 DIAGNOSIS — J30.1 SEASONAL ALLERGIC RHINITIS DUE TO POLLEN: ICD-10-CM

## 2022-04-05 DIAGNOSIS — G44.86 CERVICOGENIC HEADACHE: Primary | ICD-10-CM

## 2022-04-05 PROBLEM — R47.9 SPEECH DISTURBANCE: Status: ACTIVE | Noted: 2022-04-05

## 2022-04-05 PROBLEM — K82.8 BILIARY DYSKINESIA: Status: RESOLVED | Noted: 2022-02-23 | Resolved: 2022-04-05

## 2022-04-05 PROCEDURE — 99214 OFFICE O/P EST MOD 30 MIN: CPT | Performed by: NURSE PRACTITIONER

## 2022-04-05 PROCEDURE — 3074F SYST BP LT 130 MM HG: CPT | Performed by: NURSE PRACTITIONER

## 2022-04-05 PROCEDURE — 3079F DIAST BP 80-89 MM HG: CPT | Performed by: NURSE PRACTITIONER

## 2022-04-05 NOTE — ASSESSMENT & PLAN NOTE
Refer neurology for further evaluation   Discussed w pt red flag symptoms that if they occur, pt should immediately go to ER. Pt states understanding.

## 2022-04-11 ENCOUNTER — TELEPHONE (OUTPATIENT)
Dept: FAMILY MEDICINE CLINIC | Facility: CLINIC | Age: 27
End: 2022-04-11

## 2022-04-11 NOTE — TELEPHONE ENCOUNTER
Pt was in the ER on 04/04 for severe headache. She then followed up with Yoselin Valderrama but stated that she was not helped at that appointment. Pt then saw the primary doctor of her fiance and was told by Dr. Bart Pretty that perhaps the pt needed a \"CSF\" test.  Pt wanted Dr. Mikey Hou to order this test.  I advised and scheduled an Office visit to discuss this. (Dr. Erin Elizabeth did not order the test because he was not the designated primary doctor).

## 2022-04-12 ENCOUNTER — LAB ENCOUNTER (OUTPATIENT)
Dept: LAB | Age: 27
End: 2022-04-12
Attending: FAMILY MEDICINE
Payer: COMMERCIAL

## 2022-04-12 ENCOUNTER — OFFICE VISIT (OUTPATIENT)
Dept: FAMILY MEDICINE CLINIC | Facility: CLINIC | Age: 27
End: 2022-04-12
Payer: COMMERCIAL

## 2022-04-12 VITALS
HEIGHT: 66 IN | WEIGHT: 149.19 LBS | DIASTOLIC BLOOD PRESSURE: 94 MMHG | HEART RATE: 83 BPM | SYSTOLIC BLOOD PRESSURE: 160 MMHG | BODY MASS INDEX: 23.98 KG/M2 | TEMPERATURE: 97 F

## 2022-04-12 DIAGNOSIS — R51.9 OCCIPITAL HEADACHE: Primary | ICD-10-CM

## 2022-04-12 DIAGNOSIS — I10 ESSENTIAL HYPERTENSION: ICD-10-CM

## 2022-04-12 DIAGNOSIS — N93.9 VAGINA BLEEDING: ICD-10-CM

## 2022-04-12 DIAGNOSIS — R25.1 TREMOR OF BOTH HANDS: ICD-10-CM

## 2022-04-12 DIAGNOSIS — J34.89 RHINORRHEA: ICD-10-CM

## 2022-04-12 DIAGNOSIS — R47.89 OTHER SPEECH DISTURBANCE: ICD-10-CM

## 2022-04-12 LAB
BASOPHILS # BLD AUTO: 0.07 X10(3) UL (ref 0–0.2)
BASOPHILS NFR BLD AUTO: 0.9 %
DEPRECATED RDW RBC AUTO: 41.6 FL (ref 35.1–46.3)
EOSINOPHIL # BLD AUTO: 0.3 X10(3) UL (ref 0–0.7)
EOSINOPHIL NFR BLD AUTO: 3.9 %
ERYTHROCYTE [DISTWIDTH] IN BLOOD BY AUTOMATED COUNT: 12.8 % (ref 11–15)
HCT VFR BLD AUTO: 40.8 %
HGB BLD-MCNC: 13.1 G/DL
IMM GRANULOCYTES # BLD AUTO: 0.03 X10(3) UL (ref 0–1)
IMM GRANULOCYTES NFR BLD: 0.4 %
LYMPHOCYTES # BLD AUTO: 2.05 X10(3) UL (ref 1–4)
LYMPHOCYTES NFR BLD AUTO: 26.7 %
MCH RBC QN AUTO: 28.3 PG (ref 26–34)
MCHC RBC AUTO-ENTMCNC: 32.1 G/DL (ref 31–37)
MCV RBC AUTO: 88.1 FL
MONOCYTES # BLD AUTO: 0.52 X10(3) UL (ref 0.1–1)
MONOCYTES NFR BLD AUTO: 6.8 %
NEUTROPHILS # BLD AUTO: 4.72 X10 (3) UL (ref 1.5–7.7)
NEUTROPHILS # BLD AUTO: 4.72 X10(3) UL (ref 1.5–7.7)
NEUTROPHILS NFR BLD AUTO: 61.3 %
PLATELET # BLD AUTO: 246 10(3)UL (ref 150–450)
PROLACTIN SERPL-MCNC: 10.5 NG/ML
RBC # BLD AUTO: 4.63 X10(6)UL
TSI SER-ACNC: 0.95 MIU/ML (ref 0.36–3.74)
WBC # BLD AUTO: 7.7 X10(3) UL (ref 4–11)

## 2022-04-12 PROCEDURE — 84443 ASSAY THYROID STIM HORMONE: CPT

## 2022-04-12 PROCEDURE — 3008F BODY MASS INDEX DOCD: CPT | Performed by: FAMILY MEDICINE

## 2022-04-12 PROCEDURE — 3080F DIAST BP >= 90 MM HG: CPT | Performed by: FAMILY MEDICINE

## 2022-04-12 PROCEDURE — 99215 OFFICE O/P EST HI 40 MIN: CPT | Performed by: FAMILY MEDICINE

## 2022-04-12 PROCEDURE — 36415 COLL VENOUS BLD VENIPUNCTURE: CPT

## 2022-04-12 PROCEDURE — 3077F SYST BP >= 140 MM HG: CPT | Performed by: FAMILY MEDICINE

## 2022-04-12 PROCEDURE — 85025 COMPLETE CBC W/AUTO DIFF WBC: CPT

## 2022-04-12 PROCEDURE — 84146 ASSAY OF PROLACTIN: CPT

## 2022-04-12 RX ORDER — PROPRANOLOL HYDROCHLORIDE 80 MG/1
80 CAPSULE, EXTENDED RELEASE ORAL DAILY
Qty: 90 CAPSULE | Refills: 1 | Status: SHIPPED | OUTPATIENT
Start: 2022-04-12 | End: 2022-05-12

## 2022-04-12 RX ORDER — AMITRIPTYLINE HYDROCHLORIDE 10 MG/1
TABLET, FILM COATED ORAL
Qty: 60 TABLET | Refills: 1 | Status: SHIPPED | OUTPATIENT
Start: 2022-04-12

## 2022-04-13 ENCOUNTER — OFFICE VISIT (OUTPATIENT)
Dept: NEUROLOGY | Facility: CLINIC | Age: 27
End: 2022-04-13
Payer: COMMERCIAL

## 2022-04-13 VITALS
SYSTOLIC BLOOD PRESSURE: 142 MMHG | BODY MASS INDEX: 24 KG/M2 | RESPIRATION RATE: 17 BRPM | HEART RATE: 86 BPM | DIASTOLIC BLOOD PRESSURE: 82 MMHG | WEIGHT: 148 LBS

## 2022-04-13 DIAGNOSIS — R51.9 OCCIPITAL HEADACHE: Primary | ICD-10-CM

## 2022-04-13 DIAGNOSIS — G96.00 CSF LEAK: ICD-10-CM

## 2022-04-13 PROCEDURE — 3079F DIAST BP 80-89 MM HG: CPT | Performed by: OTHER

## 2022-04-13 PROCEDURE — 3077F SYST BP >= 140 MM HG: CPT | Performed by: OTHER

## 2022-04-13 PROCEDURE — 99214 OFFICE O/P EST MOD 30 MIN: CPT | Performed by: OTHER

## 2022-04-13 NOTE — PROGRESS NOTES
Pt reports she has \"constant head pressure in the back of her head\". Patient denies pain associated with pressure. Denies visual changes, present for last 2 weeks. Patient reports she has a watery substance that drips out of the left side of her nose. Patient reports she has had this issue for two weeks. Patient reports bilateral intermittent hand shaking that has been present of the last two month.

## 2022-04-27 ENCOUNTER — HOSPITAL ENCOUNTER (OUTPATIENT)
Dept: MRI IMAGING | Age: 27
Discharge: HOME | End: 2022-04-27
Attending: Other
Payer: COMMERCIAL

## 2022-04-27 DIAGNOSIS — R51.9 OCCIPITAL HEADACHE: ICD-10-CM

## 2022-04-27 DIAGNOSIS — G96.00 CSF LEAK: ICD-10-CM

## 2022-04-27 PROCEDURE — 72156 MRI NECK SPINE W/O & W/DYE: CPT | Performed by: OTHER

## 2022-04-27 PROCEDURE — 72158 MRI LUMBAR SPINE W/O & W/DYE: CPT | Performed by: OTHER

## 2022-04-27 PROCEDURE — A9575 INJ GADOTERATE MEGLUMI 0.1ML: HCPCS | Performed by: OTHER

## 2022-05-06 ENCOUNTER — PATIENT MESSAGE (OUTPATIENT)
Dept: NEUROLOGY | Facility: CLINIC | Age: 27
End: 2022-05-06

## 2022-05-06 NOTE — TELEPHONE ENCOUNTER
From: Marcelina Ovens  To: Fouzia Enrique DO  Sent: 5/6/2022 11:48 AM CDT  Subject: Results    Hello,  I know you mentioned the MRI's look fine. My head pressure has gotten better only comes and goes. But I still have this watery liquid from the left nostril and down my throat. Just now at work for example I went to look down and it literally came out of my left nose like water. This is still concerning to me since this is not normal for me. Please let me know, thanks.

## 2022-07-05 ENCOUNTER — APPOINTMENT (OUTPATIENT)
Dept: CT IMAGING | Facility: HOSPITAL | Age: 27
End: 2022-07-05
Attending: NURSE PRACTITIONER
Payer: COMMERCIAL

## 2022-07-05 ENCOUNTER — APPOINTMENT (OUTPATIENT)
Dept: ULTRASOUND IMAGING | Facility: HOSPITAL | Age: 27
End: 2022-07-05
Attending: NURSE PRACTITIONER
Payer: COMMERCIAL

## 2022-07-05 ENCOUNTER — HOSPITAL ENCOUNTER (EMERGENCY)
Facility: HOSPITAL | Age: 27
Discharge: HOME | End: 2022-07-05
Payer: COMMERCIAL

## 2022-07-05 VITALS
TEMPERATURE: 98 F | DIASTOLIC BLOOD PRESSURE: 88 MMHG | HEART RATE: 63 BPM | SYSTOLIC BLOOD PRESSURE: 137 MMHG | RESPIRATION RATE: 15 BRPM | OXYGEN SATURATION: 98 %

## 2022-07-05 DIAGNOSIS — R10.9 ABDOMINAL PAIN, ACUTE: Primary | ICD-10-CM

## 2022-07-05 LAB
ANION GAP SERPL CALC-SCNC: 9 MMOL/L (ref 0–18)
B-HCG UR QL: NEGATIVE
BASOPHILS # BLD AUTO: 0.07 X10(3) UL (ref 0–0.2)
BASOPHILS NFR BLD AUTO: 0.8 %
BILIRUB UR QL: NEGATIVE
BUN BLD-MCNC: 7 MG/DL (ref 7–18)
BUN/CREAT SERPL: 8.8 (ref 10–20)
CALCIUM BLD-MCNC: 8.5 MG/DL (ref 8.5–10.1)
CHLORIDE SERPL-SCNC: 107 MMOL/L (ref 98–112)
CLARITY UR: CLEAR
CO2 SERPL-SCNC: 25 MMOL/L (ref 21–32)
COLOR UR: YELLOW
CREAT BLD-MCNC: 0.8 MG/DL
DEPRECATED RDW RBC AUTO: 38.8 FL (ref 35.1–46.3)
EOSINOPHIL # BLD AUTO: 0.3 X10(3) UL (ref 0–0.7)
EOSINOPHIL NFR BLD AUTO: 3.3 %
ERYTHROCYTE [DISTWIDTH] IN BLOOD BY AUTOMATED COUNT: 12 % (ref 11–15)
GLUCOSE BLD-MCNC: 85 MG/DL (ref 70–99)
GLUCOSE UR-MCNC: NEGATIVE MG/DL
HCT VFR BLD AUTO: 37.8 %
HGB BLD-MCNC: 12.4 G/DL
HGB UR QL STRIP.AUTO: NEGATIVE
IMM GRANULOCYTES # BLD AUTO: 0.03 X10(3) UL (ref 0–1)
IMM GRANULOCYTES NFR BLD: 0.3 %
KETONES UR-MCNC: NEGATIVE MG/DL
LEUKOCYTE ESTERASE UR QL STRIP.AUTO: NEGATIVE
LYMPHOCYTES # BLD AUTO: 2.21 X10(3) UL (ref 1–4)
LYMPHOCYTES NFR BLD AUTO: 24.4 %
MCH RBC QN AUTO: 29.1 PG (ref 26–34)
MCHC RBC AUTO-ENTMCNC: 32.8 G/DL (ref 31–37)
MCV RBC AUTO: 88.7 FL
MONOCYTES # BLD AUTO: 0.56 X10(3) UL (ref 0.1–1)
MONOCYTES NFR BLD AUTO: 6.2 %
NEUTROPHILS # BLD AUTO: 5.89 X10 (3) UL (ref 1.5–7.7)
NEUTROPHILS # BLD AUTO: 5.89 X10(3) UL (ref 1.5–7.7)
NEUTROPHILS NFR BLD AUTO: 65 %
NITRITE UR QL STRIP.AUTO: NEGATIVE
OSMOLALITY SERPL CALC.SUM OF ELEC: 289 MOSM/KG (ref 275–295)
PH UR: 6 [PH] (ref 5–8)
PLATELET # BLD AUTO: 221 10(3)UL (ref 150–450)
POTASSIUM SERPL-SCNC: 3.6 MMOL/L (ref 3.5–5.1)
PROT UR-MCNC: NEGATIVE MG/DL
RBC # BLD AUTO: 4.26 X10(6)UL
SODIUM SERPL-SCNC: 141 MMOL/L (ref 136–145)
SP GR UR STRIP: 1.02 (ref 1–1.03)
UROBILINOGEN UR STRIP-ACNC: 0.2
WBC # BLD AUTO: 9.1 X10(3) UL (ref 4–11)

## 2022-07-05 PROCEDURE — 81025 URINE PREGNANCY TEST: CPT

## 2022-07-05 PROCEDURE — 81003 URINALYSIS AUTO W/O SCOPE: CPT | Performed by: NURSE PRACTITIONER

## 2022-07-05 PROCEDURE — 96375 TX/PRO/DX INJ NEW DRUG ADDON: CPT

## 2022-07-05 PROCEDURE — 85025 COMPLETE CBC W/AUTO DIFF WBC: CPT | Performed by: NURSE PRACTITIONER

## 2022-07-05 PROCEDURE — 76830 TRANSVAGINAL US NON-OB: CPT | Performed by: NURSE PRACTITIONER

## 2022-07-05 PROCEDURE — 99284 EMERGENCY DEPT VISIT MOD MDM: CPT

## 2022-07-05 PROCEDURE — 80048 BASIC METABOLIC PNL TOTAL CA: CPT | Performed by: NURSE PRACTITIONER

## 2022-07-05 PROCEDURE — 96374 THER/PROPH/DIAG INJ IV PUSH: CPT

## 2022-07-05 PROCEDURE — 76856 US EXAM PELVIC COMPLETE: CPT | Performed by: NURSE PRACTITIONER

## 2022-07-05 PROCEDURE — 96361 HYDRATE IV INFUSION ADD-ON: CPT

## 2022-07-05 PROCEDURE — 74176 CT ABD & PELVIS W/O CONTRAST: CPT | Performed by: NURSE PRACTITIONER

## 2022-07-05 RX ORDER — IBUPROFEN 600 MG/1
600 TABLET ORAL EVERY 8 HOURS PRN
Qty: 30 TABLET | Refills: 0 | Status: SHIPPED | OUTPATIENT
Start: 2022-07-05 | End: 2022-07-15

## 2022-07-05 RX ORDER — ONDANSETRON 2 MG/ML
4 INJECTION INTRAMUSCULAR; INTRAVENOUS ONCE
Status: COMPLETED | OUTPATIENT
Start: 2022-07-05 | End: 2022-07-05

## 2022-07-05 RX ORDER — MORPHINE SULFATE 4 MG/ML
4 INJECTION, SOLUTION INTRAMUSCULAR; INTRAVENOUS ONCE
Status: COMPLETED | OUTPATIENT
Start: 2022-07-05 | End: 2022-07-05

## 2022-07-05 RX ORDER — DICYCLOMINE HCL 20 MG
20 TABLET ORAL 4 TIMES DAILY PRN
Qty: 30 TABLET | Refills: 0 | Status: SHIPPED | OUTPATIENT
Start: 2022-07-05 | End: 2022-08-04

## 2022-07-08 ENCOUNTER — HOSPITAL ENCOUNTER (EMERGENCY)
Facility: HOSPITAL | Age: 27
Discharge: HOME | End: 2022-07-08
Payer: COMMERCIAL

## 2022-07-08 ENCOUNTER — TELEPHONE (OUTPATIENT)
Dept: FAMILY MEDICINE CLINIC | Facility: CLINIC | Age: 27
End: 2022-07-08

## 2022-07-08 VITALS
SYSTOLIC BLOOD PRESSURE: 138 MMHG | HEART RATE: 70 BPM | DIASTOLIC BLOOD PRESSURE: 92 MMHG | RESPIRATION RATE: 18 BRPM | OXYGEN SATURATION: 99 % | TEMPERATURE: 98 F

## 2022-07-08 DIAGNOSIS — R10.9 ABDOMINAL PAIN OF UNKNOWN ETIOLOGY: Primary | ICD-10-CM

## 2022-07-08 LAB
B-HCG UR QL: NEGATIVE
BASOPHILS # BLD AUTO: 0.07 X10(3) UL (ref 0–0.2)
BASOPHILS NFR BLD AUTO: 0.8 %
BILIRUB UR QL: NEGATIVE
CLARITY UR: CLEAR
COLOR UR: YELLOW
DEPRECATED RDW RBC AUTO: 40.2 FL (ref 35.1–46.3)
EOSINOPHIL # BLD AUTO: 0.35 X10(3) UL (ref 0–0.7)
EOSINOPHIL NFR BLD AUTO: 4 %
ERYTHROCYTE [DISTWIDTH] IN BLOOD BY AUTOMATED COUNT: 12 % (ref 11–15)
GLUCOSE UR-MCNC: NEGATIVE MG/DL
HCT VFR BLD AUTO: 39.6 %
HGB BLD-MCNC: 12.6 G/DL
HGB UR QL STRIP.AUTO: NEGATIVE
IMM GRANULOCYTES # BLD AUTO: 0.03 X10(3) UL (ref 0–1)
IMM GRANULOCYTES NFR BLD: 0.3 %
KETONES UR-MCNC: NEGATIVE MG/DL
LEUKOCYTE ESTERASE UR QL STRIP.AUTO: NEGATIVE
LYMPHOCYTES # BLD AUTO: 2.16 X10(3) UL (ref 1–4)
LYMPHOCYTES NFR BLD AUTO: 24.9 %
MCH RBC QN AUTO: 28.8 PG (ref 26–34)
MCHC RBC AUTO-ENTMCNC: 31.8 G/DL (ref 31–37)
MCV RBC AUTO: 90.6 FL
MONOCYTES # BLD AUTO: 0.6 X10(3) UL (ref 0.1–1)
MONOCYTES NFR BLD AUTO: 6.9 %
NEUTROPHILS # BLD AUTO: 5.47 X10 (3) UL (ref 1.5–7.7)
NEUTROPHILS # BLD AUTO: 5.47 X10(3) UL (ref 1.5–7.7)
NEUTROPHILS NFR BLD AUTO: 63.1 %
NITRITE UR QL STRIP.AUTO: NEGATIVE
PH UR: 6 [PH] (ref 5–8)
PLATELET # BLD AUTO: 224 10(3)UL (ref 150–450)
PROT UR-MCNC: NEGATIVE MG/DL
RBC # BLD AUTO: 4.37 X10(6)UL
SP GR UR STRIP: 1.02 (ref 1–1.03)
UROBILINOGEN UR STRIP-ACNC: 0.2
WBC # BLD AUTO: 8.7 X10(3) UL (ref 4–11)

## 2022-07-08 PROCEDURE — 81003 URINALYSIS AUTO W/O SCOPE: CPT | Performed by: NURSE PRACTITIONER

## 2022-07-08 PROCEDURE — 96361 HYDRATE IV INFUSION ADD-ON: CPT

## 2022-07-08 PROCEDURE — 85025 COMPLETE CBC W/AUTO DIFF WBC: CPT | Performed by: NURSE PRACTITIONER

## 2022-07-08 PROCEDURE — 96374 THER/PROPH/DIAG INJ IV PUSH: CPT

## 2022-07-08 PROCEDURE — 81025 URINE PREGNANCY TEST: CPT

## 2022-07-08 PROCEDURE — 99284 EMERGENCY DEPT VISIT MOD MDM: CPT

## 2022-07-08 RX ORDER — DICYCLOMINE HCL 20 MG
20 TABLET ORAL 4 TIMES DAILY PRN
Qty: 10 TABLET | Refills: 0 | Status: SHIPPED | OUTPATIENT
Start: 2022-07-08 | End: 2022-08-07

## 2022-07-08 RX ORDER — KETOROLAC TROMETHAMINE 15 MG/ML
15 INJECTION, SOLUTION INTRAMUSCULAR; INTRAVENOUS ONCE
Status: COMPLETED | OUTPATIENT
Start: 2022-07-08 | End: 2022-07-08

## 2022-07-08 NOTE — TELEPHONE ENCOUNTER
Pt stated that she was at the ER on 7/5 but she continues to have the abd pain on her right lower side. The medication that was given to her is not helping. Pt stated that the pain comes and goes and when it comes its a 7/10. Pt denied fever, vomiting or diarrhea. Pt was advised if she has new s/sx or pain had gotten worse she should go back to ER. Pt stated that her pain is the same as when she went to ER. Pt was advised she needs to be seen today for a evaluation. Pt will see if she can come in as she is at work. I will call pt back in 10 min.

## 2022-07-08 NOTE — TELEPHONE ENCOUNTER
Pt was called back but there are no appt left for today. Pt stated that she will go to [facility]/C in [place] or  today.

## 2023-06-02 NOTE — PROGRESS NOTES
Patient ID: Yeny Collins is a 22year old female. 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 No tobacco use.      Pt points to a distention, abdominal pain, blood in stool and nausea. Skin: Negative for color change. Neurological: Positive for weakness (In the morning). Negative for speech difficulty. Psychiatric/Behavioral: Negative for dysphoric mood.  The patient is not nerv is alert and oriented to person, place, and time. Skin: Skin is warm. No pallor  Psychiatry: He has some mild anxiety    Vitals reviewed.          ASSESSMENT/PLAN:     Diagnoses and all orders for this visit:    Periumbilical abdominal pain  -     HELIC blood in the stool and in the toilet a couple weeks ago.           Referral Priority:Routine          Referral Type:OFFICE VISIT          Referred to Provider:Xiao Bradford MD          Requested Specialty:GASTROENTEROLOGY          Number of Visi Refer to the Assessment tab to view/cancel completed assessment.

## 2023-07-28 NOTE — PAT NURSING NOTE
PreOp Instructions for pelvic venogram     You are scheduled for: an Interventional Radiology Procedure     Date of Procedure: 08/08/23. Check in at 8:30 am     Diet Instructions: Do not eat or drink anything after midnight or your procedure will be cancelled     Please fax history and physical to [phone]     Other Contrast Allergy Instructions: prednisone 50 mg at 8:00 pm, 2:00 am and bring last dose of prednisone and 25 mg of benadryl with you    Skin Prep: Shower with antibacterial soap using a clean washcloth, prior to procedure          Driving After Procedure: If sedation is given, you WILL NOT be able to drive home. You will need a responsible adult  to drive you home. Discharge Teaching: Your nurse will give you specific instructions before discharge; Most people can resume normal activities in 2-3 days; Any questions, please call the physician's office        Park in the [place] parking [facility]. Check in at the [facility] reception desk. Our  will be there to check you in for your procedure. Please bring your insurance cards and ID with you.  [employer] parking is available starting at 5 am.

## 2023-08-08 ENCOUNTER — HOSPITAL ENCOUNTER (OUTPATIENT)
Dept: INTERVENTIONAL RADIOLOGY/VASCULAR | Facility: HOSPITAL | Age: 28
Discharge: HOME | End: 2023-08-08
Attending: OBSTETRICS & GYNECOLOGY | Admitting: OBSTETRICS & GYNECOLOGY
Payer: COMMERCIAL

## 2023-08-08 VITALS
SYSTOLIC BLOOD PRESSURE: 119 MMHG | BODY MASS INDEX: 20.25 KG/M2 | HEART RATE: 72 BPM | OXYGEN SATURATION: 98 % | RESPIRATION RATE: 16 BRPM | HEIGHT: 66 IN | TEMPERATURE: 98 F | DIASTOLIC BLOOD PRESSURE: 75 MMHG | WEIGHT: 126 LBS

## 2023-08-08 DIAGNOSIS — N94.89 PELVIC CONGESTION: ICD-10-CM

## 2023-08-08 LAB
B-HCG UR QL: NEGATIVE
INR: 1 (ref 0.8–1.3)

## 2023-08-08 PROCEDURE — 75833 VEIN X-RAY KIDNEYS: CPT | Performed by: RADIOLOGY

## 2023-08-08 PROCEDURE — 85610 PROTHROMBIN TIME: CPT | Performed by: RADIOLOGY

## 2023-08-08 PROCEDURE — 36012 PLACE CATHETER IN VEIN: CPT | Performed by: RADIOLOGY

## 2023-08-08 PROCEDURE — 99153 MOD SED SAME PHYS/QHP EA: CPT | Performed by: RADIOLOGY

## 2023-08-08 PROCEDURE — 36011 PLACE CATHETER IN VEIN: CPT | Performed by: RADIOLOGY

## 2023-08-08 PROCEDURE — 81025 URINE PREGNANCY TEST: CPT

## 2023-08-08 PROCEDURE — 99152 MOD SED SAME PHYS/QHP 5/>YRS: CPT | Performed by: RADIOLOGY

## 2023-08-08 PROCEDURE — 75822 VEIN X-RAY ARMS/LEGS: CPT | Performed by: RADIOLOGY

## 2023-08-08 PROCEDURE — B5191ZZ FLUOROSCOPY OF INFERIOR VENA CAVA USING LOW OSMOLAR CONTRAST: ICD-10-PCS | Performed by: ANESTHESIOLOGY

## 2023-08-08 PROCEDURE — B51H1ZZ FLUOROSCOPY OF BILATERAL PELVIC (ILIAC) VEINS USING LOW OSMOLAR CONTRAST: ICD-10-PCS | Performed by: ANESTHESIOLOGY

## 2023-08-08 PROCEDURE — B51L1ZZ FLUOROSCOPY OF BILATERAL RENAL VEINS USING LOW OSMOLAR CONTRAST: ICD-10-PCS | Performed by: ANESTHESIOLOGY

## 2023-08-08 RX ORDER — ONDANSETRON 2 MG/ML
INJECTION INTRAMUSCULAR; INTRAVENOUS
Status: COMPLETED
Start: 2023-08-08 | End: 2023-08-08

## 2023-08-08 RX ORDER — IOHEXOL 350 MG/ML
140 INJECTION, SOLUTION INTRAVENOUS
Status: COMPLETED | OUTPATIENT
Start: 2023-08-08 | End: 2023-08-08

## 2023-08-08 RX ORDER — LIDOCAINE HYDROCHLORIDE 10 MG/ML
INJECTION, SOLUTION INFILTRATION; PERINEURAL
Status: COMPLETED
Start: 2023-08-08 | End: 2023-08-08

## 2023-08-08 RX ORDER — MIDAZOLAM HYDROCHLORIDE 1 MG/ML
INJECTION INTRAMUSCULAR; INTRAVENOUS
Status: COMPLETED
Start: 2023-08-08 | End: 2023-08-08

## 2023-08-08 RX ORDER — DIPHENHYDRAMINE HYDROCHLORIDE 50 MG/ML
INJECTION INTRAMUSCULAR; INTRAVENOUS
Status: COMPLETED
Start: 2023-08-08 | End: 2023-08-08

## 2023-08-08 RX ORDER — HEPARIN SODIUM 5000 [USP'U]/ML
INJECTION, SOLUTION INTRAVENOUS; SUBCUTANEOUS
Status: COMPLETED
Start: 2023-08-08 | End: 2023-08-08

## 2023-08-08 RX ORDER — SODIUM CHLORIDE 9 MG/ML
INJECTION, SOLUTION INTRAVENOUS CONTINUOUS
Status: DISCONTINUED | OUTPATIENT
Start: 2023-08-08 | End: 2023-08-08

## 2023-08-08 RX ADMIN — IOHEXOL 90 ML: 350 INJECTION, SOLUTION INTRAVENOUS at 10:58:00

## 2023-08-08 RX ADMIN — SODIUM CHLORIDE: 9 INJECTION, SOLUTION INTRAVENOUS at 09:18:00

## 2023-08-08 NOTE — PROCEDURES
[address] [facility] Patient Status:  Outpatient    6/10/1995 MRN [id number]   Location [address] [facility] Attending Raquel Camp MD   Hosp Day # 0 PCP Yun Madrid DO         Brief Procedure Report    Pre-Operative Diagnosis: Pelvic pain. Endometriosis    Post-Operative Diagnosis: Same as above. Procedure Performed: Gonadal/pelvic venogram    Anesthesia: 1% lidocaine. Moderate sedation    EBL: 4cc    Complications: None    Summary of Case: Right transfemoral approach. No reflux or gonadal venous dilation. Some reflux noted in left and right internal iliac venography worse with valsava/tilt. No May Thurner seen. Patient tolerated procedure well without immediate complication. Full report to follow in PACS.     Mckenna Moreno

## 2023-08-08 NOTE — DISCHARGE INSTRUCTIONS
HOME CARE INSTRUCTIONS FOLLOWING VENOUS ACCESS PROCEDURES:   MYOCARDIAL BIOPSY, RIGHT HEART CATHETERIZATION, VENAGRAM, IVC FILTER INSERTION, CLOSURE OF ASD OR PFO     Activity    DO NOT drive after the procedure. You may resume driving the following day according to the nurse or physician's instructions    Plan on resting and relaxing tonight and tomorrow    Do not lift anything over 10 pounds for the next 24 hours    Avoid sexual activity for the next 24 hours    Avoid drinking alcohol for the next 24 hours    Resume your normal activity after 24 hours, or as instructed by your physician     What is Normal?    The procedure site may appear bruised or discolored    The procedure site may be tender to the touch    There may be a small amount of drainage on the bandage     Special Instructions    The bandage is to remain in place for 24 hours    After 24 hours, you must remove the bandage. You should shower after removing the bandage, and wash the procedure site gently with soap and water. (If you choose to wear a bandage for a few days, make sure it remains clean and dry and that it is changed daily.)     When you should NOTIFY YOUR PHYSICIAN    If you have shortness of breath or a persistent cough    If you have chest pain (angina)    If you have palpitations or irregular heart beats    If you have persistent pain at the procedure site    If you experience signs of a fever, temperature >101 degrees, chills, infection (redness, swelling, thick yellow drainage, or a foul odor from the procedure site)     Other    You may resume your present diet, unless otherwise specified by your physician    You may resume all of your medications as prescribed, unless otherwise directed by your physician.  A list of your medications was provided to you at discharge

## 2023-08-08 NOTE — PROGRESS NOTES
Pt received s/p pelvic venogram with Dr. Poonam Ramos. Right femoral venous access site closed with manual pressure. Site CDI, no bleeding or swelling present. Dr. Poonam Ramos at bedside and spoke with pt's . Recovery complete. Discharge instructions given to pt and pt's . IV discontinued, pt taken down to [facility] via wheelchair for discharge. Pt's  driving pt home.

## 2023-08-08 NOTE — PROCEDURES
I have discussed with the patient and/or legal representative the potential benefits, risks, and side effects of this pelvic venogram procedure, the likelihood of the patient achieving goals; and the potential problems that might occur during recuperation. I discussed reasonable alternatives to the procedure, including risks, benefits and side effects related to the alternatives, and risks related to not receiving this procedure.

## 2023-08-18 ENCOUNTER — APPOINTMENT (OUTPATIENT)
Dept: INTERVENTIONAL RADIOLOGY/VASCULAR | Age: 28
End: 2023-08-18
Attending: OBSTETRICS & GYNECOLOGY

## 2024-02-12 NOTE — ED AVS SNAPSHOT
I am out of office. She should probably be seen in clinic.   eDbi Mack   MRN: [id number]    Department:  [facility] Emergency Department   Date of Visit:  12/13/2017           Disclosure     Insurance plans vary and the physician(s) referred by the ER may not be covered by your plan.  Please contact CARE PHYSICIAN AT ONCE OR RETURN IMMEDIATELY TO THE EMERGENCY DEPARTMENT. If you have been prescribed any medication(s), please fill your prescription right away and begin taking the medication(s) as directed.   If you believe that any of the medications

## 2024-07-31 NOTE — TELEPHONE ENCOUNTER
Pt advised, appt applied for Friday per pt preference Render In Strict Bullet Format?: No Detail Level: Simple Initiate Treatment: Fluocinonide gel Plan: Patient counseled to avoid picking/scratching affected areas.\\nRecommended avoiding shaving scalp without a guard - recommended leaving a little stubble. Discussed the razor grazing these could be irritating and patient does have a history of ingrown hairs affecting the neck.

## 2024-09-12 RX ORDER — RELUGOLIX, ESTRADIOL HEMIHYDRATE, AND NORETHINDRONE ACETATE 40; 1; .5 MG/1; MG/1; MG/1
1 TABLET, FILM COATED ORAL DAILY
COMMUNITY

## 2024-09-17 NOTE — H&P
[facility]   part of [facility]    History and Physical    Ragini Callowya Patient Status:  Hospital Outpatient Surgery    6/10/1995 MRN [id number]   Location [facility] SURGERY Attending Bryce Gooden, Checo CLIFTON MD   Hosp Day # 0 PCP Dinesh Friend MD     Date:  2024  Date of Admission:  (Not on file)    History provided by:patient  HPI:   No chief complaint on file.    HPI pelvic pain    History     Past Medical History:    Anxiety    Anxiety state, unspecified    Deliberate self-cutting    Depression    Essential hypertension    Pleurisy    PONV (postoperative nausea and vomiting)    Visual impairment    wears glasses     Past Surgical History:   Procedure Laterality Date    Cholecystectomy      Colonoscopy N/A 2020    Procedure: COLONOSCOPY;  Surgeon: Aaron Patrick MD;  Location: [facility] ENDOSCOPY    Colonoscopy      Upper gi endoscopy,exam       Family History   Problem Relation Age of Onset    Hypertension Father     Ovarian Cancer Maternal Grandmother     Diabetes Maternal Grandfather     Heart Disease Maternal Grandfather     Cancer Paternal Grandmother         skin, thyroid, uterine cancer     Social History:  Social History     Socioeconomic History    Marital status: Single   Tobacco Use    Smoking status: Former     Types: Cigarettes    Smokeless tobacco: Never   Vaping Use    Vaping status: Never Used   Substance and Sexual Activity    Alcohol use: Yes    Drug use: Yes     Frequency: 3.0 times per week     Types: Cannabis   Other Topics Concern    Caffeine Concern No     Comment: Soda, occasional    Exercise No     Allergies/Medications:   Allergies:   Allergies   Allergen Reactions    Radiology Contrast Iodinated Dyes SHORTNESS OF BREATH    Codeine NAUSEA AND VOMITING    Tramadol NAUSEA AND VOMITING    Iodine (Topical) RASH     No medications prior to admission.       Review of Systems:     All other systems reviewed and are negative.      Physical Exam:   Vital  Signs:  Height 5' 6\" (1.676 m), weight 135 lb (61.2 kg), last menstrual period 05/30/2024, not currently breastfeeding.  Physical Exam  Constitutional:       Appearance: Normal appearance.   Cardiovascular:      Rate and Rhythm: Normal rate and regular rhythm.      Heart sounds: Normal heart sounds.   Pulmonary:      Effort: Pulmonary effort is normal.      Breath sounds: Normal breath sounds.   Abdominal:      Palpations: Abdomen is soft.   Musculoskeletal:         General: Normal range of motion.   Skin:     General: Skin is warm and dry.   Neurological:      Mental Status: She is alert and oriented to person, place, and time.   Psychiatric:         Behavior: Behavior normal.       Cervical Papanicolaou done within 1 year of adm    Results:     Lab Results   Component Value Date    WBC 8.7 07/08/2022    HGB 12.6 07/08/2022    HCT 39.6 07/08/2022    .0 07/08/2022    CREATSERUM 0.80 07/05/2022    BUN 7 07/05/2022     07/05/2022    K 3.6 07/05/2022     07/05/2022    CO2 25.0 07/05/2022    GLU 85 07/05/2022    CA 8.5 07/05/2022    ALB 3.9 02/15/2022    ALKPHO 44 02/15/2022    BILT 0.5 02/15/2022    TP 7.2 02/15/2022    AST 8 (L) 02/15/2022    ALT 22 02/15/2022    PTT 31.8 06/13/2017    INR 1.0 08/08/2023    TSH 0.954 04/12/2022    LIP 64 (L) 02/15/2022    DDIMER 0.31 02/15/2022    ESRML 12 09/07/2021    CRP 0.57 (H) 09/07/2021    MG 1.9 10/25/2019    TROP 0.00 11/11/2017    B12 409 11/28/2018    ETOH 2 12/29/2017     No results found.        Assessment/Plan:          HYSTEROSCOPY, LAPAROSCOPY, EXCISION OF ENDOMETRIOSIS, LYSIS OF ADHESIONS,CYSTOSCOPY,TUBAL LAVAGE,UTERINE SUSPENSION         Md Checo Stevenson MD  9/17/2024

## 2024-09-18 ENCOUNTER — HOSPITAL ENCOUNTER (OUTPATIENT)
Facility: HOSPITAL | Age: 29
Setting detail: HOSPITAL OUTPATIENT SURGERY
Discharge: HOME | End: 2024-09-18
Attending: OBSTETRICS & GYNECOLOGY | Admitting: OBSTETRICS & GYNECOLOGY
Payer: COMMERCIAL

## 2024-09-18 ENCOUNTER — ANESTHESIA (OUTPATIENT)
Dept: SURGERY | Facility: HOSPITAL | Age: 29
End: 2024-09-18
Payer: COMMERCIAL

## 2024-09-18 ENCOUNTER — ANESTHESIA EVENT (OUTPATIENT)
Dept: SURGERY | Facility: HOSPITAL | Age: 29
End: 2024-09-18
Payer: COMMERCIAL

## 2024-09-18 VITALS
BODY MASS INDEX: 22.68 KG/M2 | RESPIRATION RATE: 16 BRPM | SYSTOLIC BLOOD PRESSURE: 117 MMHG | TEMPERATURE: 97 F | DIASTOLIC BLOOD PRESSURE: 68 MMHG | WEIGHT: 141.13 LBS | OXYGEN SATURATION: 100 % | HEART RATE: 66 BPM | HEIGHT: 66 IN

## 2024-09-18 DIAGNOSIS — R10.32 LLQ ABDOMINAL PAIN: Primary | ICD-10-CM

## 2024-09-18 LAB — B-HCG UR QL: NEGATIVE

## 2024-09-18 PROCEDURE — 0UJD8ZZ INSPECTION OF UTERUS AND CERVIX, VIA NATURAL OR ARTIFICIAL OPENING ENDOSCOPIC: ICD-10-PCS | Performed by: OBSTETRICS & GYNECOLOGY

## 2024-09-18 PROCEDURE — 0DNN4ZZ RELEASE SIGMOID COLON, PERCUTANEOUS ENDOSCOPIC APPROACH: ICD-10-PCS | Performed by: OBSTETRICS & GYNECOLOGY

## 2024-09-18 PROCEDURE — 3E1K88Z IRRIGATION OF GENITOURINARY TRACT USING IRRIGATING SUBSTANCE, VIA NATURAL OR ARTIFICIAL OPENING ENDOSCOPIC: ICD-10-PCS | Performed by: OBSTETRICS & GYNECOLOGY

## 2024-09-18 PROCEDURE — 81025 URINE PREGNANCY TEST: CPT

## 2024-09-18 DEVICE — ABSORBABLE ADHESION BARRIER
Type: IMPLANTABLE DEVICE | Site: UTERUS | Status: FUNCTIONAL
Brand: GYNECARE INTERCEED

## 2024-09-18 RX ORDER — GLYCOPYRROLATE 0.2 MG/ML
INJECTION, SOLUTION INTRAMUSCULAR; INTRAVENOUS AS NEEDED
Status: DISCONTINUED | OUTPATIENT
Start: 2024-09-18 | End: 2024-09-18 | Stop reason: SURG

## 2024-09-18 RX ORDER — ROCURONIUM BROMIDE 10 MG/ML
INJECTION, SOLUTION INTRAVENOUS AS NEEDED
Status: DISCONTINUED | OUTPATIENT
Start: 2024-09-18 | End: 2024-09-18 | Stop reason: SURG

## 2024-09-18 RX ORDER — SCOLOPAMINE TRANSDERMAL SYSTEM 1 MG/1
1 PATCH, EXTENDED RELEASE TRANSDERMAL ONCE
Status: DISCONTINUED | OUTPATIENT
Start: 2024-09-18 | End: 2024-09-18

## 2024-09-18 RX ORDER — HYDROCODONE BITARTRATE AND ACETAMINOPHEN 5; 325 MG/1; MG/1
1-2 TABLET ORAL EVERY 4 HOURS PRN
Qty: 20 TABLET | Refills: 0 | Status: SHIPPED | OUTPATIENT
Start: 2024-09-18

## 2024-09-18 RX ORDER — BUPIVACAINE HYDROCHLORIDE 5 MG/ML
INJECTION, SOLUTION EPIDURAL; INTRACAUDAL AS NEEDED
Status: DISCONTINUED | OUTPATIENT
Start: 2024-09-18 | End: 2024-09-18 | Stop reason: HOSPADM

## 2024-09-18 RX ORDER — DEXAMETHASONE SODIUM PHOSPHATE 4 MG/ML
VIAL (ML) INJECTION AS NEEDED
Status: DISCONTINUED | OUTPATIENT
Start: 2024-09-18 | End: 2024-09-18 | Stop reason: SURG

## 2024-09-18 RX ORDER — HYDROCODONE BITARTRATE AND ACETAMINOPHEN 5; 325 MG/1; MG/1
1 TABLET ORAL ONCE AS NEEDED
Status: DISCONTINUED | OUTPATIENT
Start: 2024-09-18 | End: 2024-09-18

## 2024-09-18 RX ORDER — DIPHENHYDRAMINE HYDROCHLORIDE 50 MG/ML
12.5 INJECTION INTRAMUSCULAR; INTRAVENOUS AS NEEDED
Status: DISCONTINUED | OUTPATIENT
Start: 2024-09-18 | End: 2024-09-18

## 2024-09-18 RX ORDER — ONDANSETRON 2 MG/ML
INJECTION INTRAMUSCULAR; INTRAVENOUS AS NEEDED
Status: DISCONTINUED | OUTPATIENT
Start: 2024-09-18 | End: 2024-09-18 | Stop reason: SURG

## 2024-09-18 RX ORDER — HYDROMORPHONE HYDROCHLORIDE 1 MG/ML
0.6 INJECTION, SOLUTION INTRAMUSCULAR; INTRAVENOUS; SUBCUTANEOUS EVERY 5 MIN PRN
Status: DISCONTINUED | OUTPATIENT
Start: 2024-09-18 | End: 2024-09-18

## 2024-09-18 RX ORDER — ACETAMINOPHEN 500 MG
1000 TABLET ORAL ONCE AS NEEDED
Status: DISCONTINUED | OUTPATIENT
Start: 2024-09-18 | End: 2024-09-18

## 2024-09-18 RX ORDER — METOCLOPRAMIDE HYDROCHLORIDE 5 MG/ML
INJECTION INTRAMUSCULAR; INTRAVENOUS AS NEEDED
Status: DISCONTINUED | OUTPATIENT
Start: 2024-09-18 | End: 2024-09-18 | Stop reason: SURG

## 2024-09-18 RX ORDER — PHENAZOPYRIDINE HYDROCHLORIDE 200 MG/1
200 TABLET, FILM COATED ORAL ONCE
Status: COMPLETED | OUTPATIENT
Start: 2024-09-18 | End: 2024-09-18

## 2024-09-18 RX ORDER — NALOXONE HYDROCHLORIDE 0.4 MG/ML
0.08 INJECTION, SOLUTION INTRAMUSCULAR; INTRAVENOUS; SUBCUTANEOUS AS NEEDED
Status: DISCONTINUED | OUTPATIENT
Start: 2024-09-18 | End: 2024-09-18

## 2024-09-18 RX ORDER — MIDAZOLAM HYDROCHLORIDE 1 MG/ML
INJECTION INTRAMUSCULAR; INTRAVENOUS AS NEEDED
Status: DISCONTINUED | OUTPATIENT
Start: 2024-09-18 | End: 2024-09-18 | Stop reason: SURG

## 2024-09-18 RX ORDER — HYDROMORPHONE HYDROCHLORIDE 1 MG/ML
0.4 INJECTION, SOLUTION INTRAMUSCULAR; INTRAVENOUS; SUBCUTANEOUS EVERY 5 MIN PRN
Status: DISCONTINUED | OUTPATIENT
Start: 2024-09-18 | End: 2024-09-18

## 2024-09-18 RX ORDER — SODIUM CHLORIDE, SODIUM LACTATE, POTASSIUM CHLORIDE, CALCIUM CHLORIDE 600; 310; 30; 20 MG/100ML; MG/100ML; MG/100ML; MG/100ML
INJECTION, SOLUTION INTRAVENOUS CONTINUOUS
Status: DISCONTINUED | OUTPATIENT
Start: 2024-09-18 | End: 2024-09-18

## 2024-09-18 RX ORDER — MEPERIDINE HYDROCHLORIDE 25 MG/ML
12.5 INJECTION INTRAMUSCULAR; INTRAVENOUS; SUBCUTANEOUS AS NEEDED
Status: DISCONTINUED | OUTPATIENT
Start: 2024-09-18 | End: 2024-09-18

## 2024-09-18 RX ORDER — HYDROCODONE BITARTRATE AND ACETAMINOPHEN 5; 325 MG/1; MG/1
2 TABLET ORAL ONCE AS NEEDED
Status: DISCONTINUED | OUTPATIENT
Start: 2024-09-18 | End: 2024-09-18

## 2024-09-18 RX ORDER — LIDOCAINE HYDROCHLORIDE 10 MG/ML
INJECTION, SOLUTION EPIDURAL; INFILTRATION; INTRACAUDAL; PERINEURAL AS NEEDED
Status: DISCONTINUED | OUTPATIENT
Start: 2024-09-18 | End: 2024-09-18 | Stop reason: SURG

## 2024-09-18 RX ORDER — ONDANSETRON 8 MG/1
8 TABLET, ORALLY DISINTEGRATING ORAL EVERY 4 HOURS PRN
Qty: 10 TABLET | Refills: 0 | Status: SHIPPED | OUTPATIENT
Start: 2024-09-18

## 2024-09-18 RX ORDER — ACETAMINOPHEN 500 MG
1000 TABLET ORAL ONCE
Status: DISCONTINUED | OUTPATIENT
Start: 2024-09-18 | End: 2024-09-18 | Stop reason: HOSPADM

## 2024-09-18 RX ORDER — KETOROLAC TROMETHAMINE 30 MG/ML
INJECTION, SOLUTION INTRAMUSCULAR; INTRAVENOUS AS NEEDED
Status: DISCONTINUED | OUTPATIENT
Start: 2024-09-18 | End: 2024-09-18 | Stop reason: SURG

## 2024-09-18 RX ORDER — NEOSTIGMINE METHYLSULFATE 1 MG/ML
INJECTION INTRAVENOUS AS NEEDED
Status: DISCONTINUED | OUTPATIENT
Start: 2024-09-18 | End: 2024-09-18 | Stop reason: SURG

## 2024-09-18 RX ORDER — ONDANSETRON 2 MG/ML
4 INJECTION INTRAMUSCULAR; INTRAVENOUS EVERY 6 HOURS PRN
Status: DISCONTINUED | OUTPATIENT
Start: 2024-09-18 | End: 2024-09-18

## 2024-09-18 RX ORDER — PROCHLORPERAZINE EDISYLATE 5 MG/ML
5 INJECTION INTRAMUSCULAR; INTRAVENOUS EVERY 8 HOURS PRN
Status: DISCONTINUED | OUTPATIENT
Start: 2024-09-18 | End: 2024-09-18

## 2024-09-18 RX ORDER — HYDROMORPHONE HYDROCHLORIDE 1 MG/ML
0.2 INJECTION, SOLUTION INTRAMUSCULAR; INTRAVENOUS; SUBCUTANEOUS EVERY 5 MIN PRN
Status: DISCONTINUED | OUTPATIENT
Start: 2024-09-18 | End: 2024-09-18

## 2024-09-18 RX ORDER — MIDAZOLAM HYDROCHLORIDE 1 MG/ML
1 INJECTION INTRAMUSCULAR; INTRAVENOUS EVERY 5 MIN PRN
Status: DISCONTINUED | OUTPATIENT
Start: 2024-09-18 | End: 2024-09-18

## 2024-09-18 RX ORDER — MEPERIDINE HYDROCHLORIDE 25 MG/ML
INJECTION INTRAMUSCULAR; INTRAVENOUS; SUBCUTANEOUS
Status: COMPLETED
Start: 2024-09-18 | End: 2024-09-18

## 2024-09-18 RX ADMIN — LIDOCAINE HYDROCHLORIDE 50 MG: 10 INJECTION, SOLUTION EPIDURAL; INFILTRATION; INTRACAUDAL; PERINEURAL at 13:39:00

## 2024-09-18 RX ADMIN — METOCLOPRAMIDE HYDROCHLORIDE 10 MG: 5 INJECTION INTRAMUSCULAR; INTRAVENOUS at 13:47:00

## 2024-09-18 RX ADMIN — GLYCOPYRROLATE 0.6 MG: 0.2 INJECTION, SOLUTION INTRAMUSCULAR; INTRAVENOUS at 14:57:00

## 2024-09-18 RX ADMIN — SODIUM CHLORIDE, SODIUM LACTATE, POTASSIUM CHLORIDE, CALCIUM CHLORIDE: 600; 310; 30; 20 INJECTION, SOLUTION INTRAVENOUS at 13:34:00

## 2024-09-18 RX ADMIN — ROCURONIUM BROMIDE 50 MG: 10 INJECTION, SOLUTION INTRAVENOUS at 13:39:00

## 2024-09-18 RX ADMIN — MIDAZOLAM HYDROCHLORIDE 2 MG: 1 INJECTION INTRAMUSCULAR; INTRAVENOUS at 13:35:00

## 2024-09-18 RX ADMIN — DEXAMETHASONE SODIUM PHOSPHATE 8 MG: 4 MG/ML VIAL (ML) INJECTION at 13:47:00

## 2024-09-18 RX ADMIN — NEOSTIGMINE METHYLSULFATE 4 MG: 1 INJECTION INTRAVENOUS at 14:57:00

## 2024-09-18 RX ADMIN — ONDANSETRON 4 MG: 2 INJECTION INTRAMUSCULAR; INTRAVENOUS at 14:26:00

## 2024-09-18 RX ADMIN — KETOROLAC TROMETHAMINE 30 MG: 30 INJECTION, SOLUTION INTRAMUSCULAR; INTRAVENOUS at 14:26:00

## 2024-09-18 NOTE — DISCHARGE INSTRUCTIONS
Home Care Instructions Following Your Hysteroscopy     Ragini-  We hope you were pleased with your care at [facility].  We wish you the best outcome and overall experience with your operation.  These instructions will help to minimize pain, promote healing, and improve the likelihood of a successful result.    What to Expect:  Expect some vaginal bleeding and spotting for 1-2 weeks after your procedure  Use pads only. No tampons.  Call the office, if you experience heavy bleeding ( saturate one pad every hour)  It is typical to experience abdominal cramping for 1-2 weeks after your procedure.  You might experience a sore throat for up to one week after your operation.  A sore throat can be due to the breathing tube used for anesthesia during your operation.  Fatigue, tiredness, or a \"washed out\" feeling is typical in the first few days following an operation.    Bathing/Showers  You can resume showering tomorrow  No baths, swimming, or hot tubs for 2 weeks     Pain Medication  Vicoprofen: Take one  tablet every 4-6 hours as needed for pain.  Do not exceed 5 (five) tablets each day  Do not take Vicoprofen, if you do not have pain.    Other Prescription Medication  The following medication might have been prescribed for you:  Zofran is a medication which can help with nausea.  Take as prescribed  An antibiotic might also have been prescribed for you and is to be taken as directed    Over-The-Counter Medication  Non-prescription anti-inflammatory medications can also help to ease the pain.  You can take Aleve or ibuprofen in two days  Take as directed on the bottle  Drink a full glass of water with the medication. Do not take medication on an empty stomach.    Home Medication  Resume your home medications as instructed  You can resume your herbs and vitamins tomorrow    Diet  Resume your normal diet    Activity  Refrain from vaginal intercourse, vaginal suppositories, tampon use, and douches for 2 weeks  You can  go up and down the stairs as tolerated.    Walking at a normal pace is acceptable    Return to Work  You can return to work in 1-2 days  Contact your doctor's office, if you need a medical note.     Driving  Do not drive if you are  taking pain medication.    Follow-up Appointment with Your Gynecologist  Call the office tomorrow for an appointment per instructions  Verify your appointment date, day, time, and location.  At your 1st postoperative office visit:  Your recovery will be assessed, and any additional concerns and instructions will be discussed.    Flores/Catheter Removal   If you had a Flores Catheter placed prior to your hospital discharge, remove Flores Catheter in the early morning, the day after surgery (Post-Op Day 1). If you are unable to void within 6 hours of removing catheter, contact office immediately.    Questions or Concerns  Call the office if you experience severe pain not controlled by pain medication, swelling, bleeding, fever, or other concerns.  If your call is made after office hours, a physician's assistant or fellow will be available to help you.  There is always a doctor from the practice who is covering the patient calls.    Ragini  Thank you for coming to [facility] for your operation.  The nurses and the anesthesiologist try very hard to make sure you receive the best care possible.  Your trust in them is greatly appreciated.    Thanks so much,  Dr. Wu, Dr. Booker, and Dr. Alejandra  [facility]        Home Care Instructions Following Your Laparoscopic Procedure      Ragini-  We hope you were pleased with your care at [facility].  We wish you the best outcome and overall experience with your operation.  These instructions will help to minimize pain, promote healing, and improve the likelihood of a successful result.    What to Expect:  Expect some vaginal bleeding, abdominal cramping, and spotting for 1-2 weeks after your procedure  Use  pads only. No tampons.  Call the office, if you experience heavy bleeding ( saturate one pad every hour)  You might experience mild-moderate upper back pain, shoulder pain, chest pain, and/or abdominal bloating from the gas used during your procedure. These symptoms can last up to 3 days.  Symptoms can be relieved by lying with your back elevated to 30 degrees.  Tenderness, burning, and/or a \"pulling\" sensation at the incisions sites are typical.  The pain can radiate to your hips, flanks, and/or groin.  This discomfort can last up to six weeks.  Pain with urination and bowel movements is typical.  This symptom can last up to two months  Fatigue, tiredness, or \"a washed out\" feeling is typical in the first few days following your procedure.  You might experience a temporary sore throat due to the breathing tube used for anesthesia during your operation.     Bandages (Dressing)  Your small abdominal incisions have been closed with absorbable sutures and white tapes (Steri-Strips).  There might be a clear plastic patch (Tegaderm) covering the tape. Blood-tinged oozing at the incision sites is to be expected.  Leave the Steri-Strips and Tegaderm in place, unless fluid accumulates under the Tegaderm  If the Steri-Strips and/or Tegaderm fall off, do not replace.    Remove the Tegaderm in one week, unless moisture or drainage collects under it.  Gently remove the Tegaderm, if you notice trapped moisture or drainage.  Do not replace.  Apply gauze as needed.  All dressings can be removed 1 week after surgery.    Wound Care  Keep your incisions clean.  Pat dry after showers.    Do not use ointments, alcohol, or peroxide on the incisions.    Bathing/Showers  You can resume showering tomorrow  No baths, swimming, or hot tubs for 2 weeks     Pain Medication  Vicoprofen: Take one  tablet every 4-6 hours as needed for pain.  Do not exceed 5 (five) tablets each day  Do not take Vicoprofen, if you do not have pain.   Pain  medication can cause constipation.  Use stool softeners, such as Milk of Magnesia or Colace.  Fruit ( prunes, apricots) can also help to prevent constipation      Other Prescription Medication  The following medication might have been prescribed for you:  Zofran is a medication which can help with nausea.  Take as prescribed  An antibiotic might also have been prescribed for you and is to be taken as directed    Over-The-Counter Medication  Non-prescription anti-inflammatory medications can also help to ease the pain.  You can take Aleve or ibuprofen in two days  Take as directed on the bottle  Drink a full glass of water with the medication. Do not take medication on an empty stomach.    Home Medication  Resume your home medications as instructed  You can resume your herbs and vitamins tomorrow    Diet  Anesthesia can slow your bowel motility.   Start a home with a liquid diet.  Resume a normal diet after you have passed gas.  Drink 16 glasses( 8 ounce) of fluid each day.     Activity  Refrain from vaginal intercourse, vaginal suppositories, tampon use, and douches for 2 weeks  No strenuous activity or heavy lifting( > 25 lbs.) for 3 weeks.  You can go up and down the stairs as tolerated.    You cannot return to work, if your work requires strenuous activity for 3 weeks  No physical exercise, sports, or gym workouts until you are allowed to participate in strenuous activity.  Walking at a normal pace is acceptable    Return to Work  You can return to work in 3-7 days, if your work is considered sedentary and does not involve strenuous activity.  Do not return to work for 3 weeks, if your job involves strenuous activity  Contact your doctor's office, if you need a medical note.     Driving  Refrain from driving for one week  Do not drive if you are  taking pain medication.    Follow-up Appointment with Your Gynecologist  Call the office tomorrow for an appointment in 2 weeks, if you are a patient of     Ade or Dr. Alejandra  Call the office tomorrow for an appointment in 4-6 weeks, if you are a fertility patient or a patient with pelvic pain of Dr. Wu.  Verify your appointment date, day, time, and location.  At your 1st postoperative office visit:  Your wounds will be evaluated, healing assessed, and any additional concerns and instructions will be discussed.    Flores/Catheter Removal   If you had a Flores Catheter placed prior to your hospital discharge, remove Flores Catheter in the early morning, the day after surgery (Post-Op Day 1). If you are unable to void within 6 hours of removing catheter, contact office immediately.    Questions or Concerns  Call the office if you experience severe pain not controlled by pain medication, swelling, persistent vomiting,  incisional redness/warmth/yellowish discharge, bleeding, fever > 100.4, shortness of breath, difficulty breathing, moderate-severe calf pain, difficulty urinating, or other concerns.  If your call is made after office hours, a physician's assistant or fellow will be available to help you.  There is always a doctor from the practice who is covering the patient calls.    Ragini  Thank you for coming to [facility] for your operation.  The nurses and the anesthesiologist try very hard to make sure you receive the best care possible.  Your trust in them is greatly appreciated.    Thanks so much,  Dr. Wu, Dr. Booker, and Dr. Alejandra  [facility] [place]      You received a drug called Toradol which is an Anti Inflammatory at:2:30 pm  If you are allowed to take Anti inflammatories:    Do not take any Anti Inflammatory like Motrin, Aleve or Ibuprophen until after:8:30 pm  Please report any suspected allergic reactions or bleeding issues to your doctor

## 2024-09-18 NOTE — BRIEF OP NOTE
Pre-Operative Diagnosis: PELVIC PAIN, ENDO     Post-Operative Diagnosis: PELVIC PAIN, interstitial cystitis     Procedure Performed:   HYSTEROSCOPY, LAPAROSCOPY, , LYSIS OF ADHESIONS,CYSTOSCOPY,TUBAL LAVAGE,    Surgeons and Role:     * Bryce Gooden, Checo CLIFTON MD - Primary     * Rocío Alejandra MD - Assisting Surgeon    Assistant(s):  RNFA: Adriana Hunter RN     Surgical Findings: interstitial cystitis     Specimen:      Estimated Blood Loss: 20cc      Md Checo Stevenson MD  9/18/2024  2:12 PM

## 2024-09-18 NOTE — ANESTHESIA PREPROCEDURE EVALUATION
PRE-OP EVALUATION    Patient Name: Ragini Calloway    Admit Diagnosis: PELVIC PAIN, ENDO    Pre-op Diagnosis: PELVIC PAIN, ENDO    HYSTEROSCOPY, LAPAROSCOPY, EXCISION OF ENDOMETRIOSIS, LYSIS OF ADHESIONS,CYSTOSCOPY,TUBAL LAVAGE,UTERINE SUSPENSION    Anesthesia Procedure: HYSTEROSCOPY, LAPAROSCOPY, EXCISION OF ENDOMETRIOSIS, LYSIS OF ADHESIONS,CYSTOSCOPY,TUBAL LAVAGE,UTERINE SUSPENSION    Surgeons and Role:     * Bryce Gooden, Checo CLIFTON MD - Primary     * Rocío Alejandra MD - Assisting Surgeon    Pre-op vitals reviewed.  Temp: 97.9 °F (36.6 °C)  Pulse: 66  Resp: 16  BP: 139/93  SpO2: 100 %  Body mass index is 22.77 kg/m².    Current medications reviewed.  Hospital Medications:   acetaminophen (Tylenol Extra Strength) tab 1,000 mg  1,000 mg Oral Once    scopolamine (Transderm-Scop) 1 MG/3DAYS patch 1 patch  1 patch Transdermal Once    lactated ringers infusion   Intravenous Continuous    [COMPLETED] phenazopyridine (Pyridum) tab 200 mg  200 mg Oral Once    ceFAZolin (Ancef) 2g in 10mL IV syringe premix  2 g Intravenous Once       Outpatient Medications:     Medications Prior to Admission   Medication Sig Dispense Refill Last Dose    Relugolix-Estradiol-Norethind (MYFEMBREE) 40-1-0.5 MG Oral Tab Take 1 tablet by mouth daily.          Allergies: Radiology contrast iodinated dyes, Codeine, Tramadol, and Iodine (topical)      Anesthesia Evaluation    Patient summary reviewed.    Anesthetic Complications  (+) history of anesthetic complications  History of: PONV       GI/Hepatic/Renal                                 Cardiovascular                  (+) hypertension                                     Endo/Other                                  Pulmonary                           Neuro/Psych      (+) depression  (+) anxiety                              Past Surgical History:   Procedure Laterality Date    Cholecystectomy      Colonoscopy N/A 03/17/2020    Procedure: COLONOSCOPY;  Surgeon: Aaron Patrick MD;  Location:  [facility] ENDOSCOPY    Colonoscopy      Upper gi endoscopy,exam       Social History     Socioeconomic History    Marital status: Single   Tobacco Use    Smoking status: Former     Types: Cigarettes    Smokeless tobacco: Never   Vaping Use    Vaping status: Never Used   Substance and Sexual Activity    Alcohol use: Yes    Drug use: Yes     Frequency: 3.0 times per week     Types: Cannabis   Other Topics Concern    Caffeine Concern No     Comment: Soda, occasional    Exercise No     History   Drug Use    Frequency: 3.0 times per week    Types: Cannabis     Available pre-op labs reviewed.               Airway      Mallampati: II  Mouth opening: >3 FB  TM distance: 4 - 6 cm  Neck ROM: full Cardiovascular    Cardiovascular exam normal.         Dental  Comment: Dentition appears grossly intact. Patient denies any loose, chipped or missing teeth other than as noted. Risks of dental trauma related to anesthesia including intubation and during emergence explained.             Pulmonary    Pulmonary exam normal.                 Other findings              ASA: 2   Plan: general  NPO status verified and patient meets guidelines.    Post-procedure pain management plan discussed with surgeon and patient.    Comment: Options, risks and benefits of anesthesia as outlined in the anesthesia consent were reviewed with the patient. Risks and benefits of GA including sore throat, allergy, nausea, vomiting, dental trauma, pain management modalities were all discussed. Particularly the risk of dental trauma with weakened teeth or crowns, partials, fillings and any non natural teeth due to instrumentation of oral cavity and airway. Patient understands risks and verbally agreed to proceed. All questions answered.The consent was signed without further questions.      Plan/risks discussed with: patient                Present on Admission:  **None**

## 2024-09-18 NOTE — ANESTHESIA POSTPROCEDURE EVALUATION
[facility]    Ragini Calloway Patient Status:  Hospital Outpatient Surgery   Age/Gender 29 year old female MRN [id number]   Location [facility] POST ANESTHESIA CARE UNIT Attending Bryce Gooden, Checo CLIFTON MD   Hosp Day # 0 PCP Dinesh Friend MD       Anesthesia Post-op Note    HYSTEROSCOPY, LAPAROSCOPY LYSIS OF ADHESIONS,CYSTOSCOPY WITH HYDRODISTENTION, TUBAL LAVAGE    Procedure Summary       Date: 09/18/24 Room / Location:  MAIN OR 12 / [facility] MAIN OR    Anesthesia Start: 1334 Anesthesia Stop: 1507    Procedure: HYSTEROSCOPY, LAPAROSCOPY LYSIS OF ADHESIONS,CYSTOSCOPY WITH HYDRODISTENTION, TUBAL LAVAGE (Abdomen) Diagnosis: (PELVIC PAIN, ENDO)    Surgeons: Bryce Gooden, Checo CLIFTON MD Anesthesiologist: Sincere Danielle MD    Anesthesia Type: general ASA Status: 2            Anesthesia Type: general    Vitals Value Taken Time   /76 09/18/24 1511   Temp 97.7 °F (36.5 °C) 09/18/24 1511   Pulse 102 09/18/24 1511   Resp 18 09/18/24 1511   SpO2 100 % 09/18/24 1511       Patient Location: PACU    Anesthesia Type: general    Airway Patency: patent    Postop Pain Control: adequate    Mental Status: mildly sedated but able to meaningfully participate in the post-anesthesia evaluation    Nausea/Vomiting: none    Cardiopulmonary/Hydration status: stable euvolemic    Complications: no apparent anesthesia related complications    Postop vital signs: stable    Comments: Pt breathing comfortably, sleepy but arousable, VSS. Report to RN.     Dental Exam: Unchanged from Preop    Patient to be discharged from PACU when criteria met.

## 2024-09-18 NOTE — ANESTHESIA PROCEDURE NOTES
Airway  Date/Time: 9/18/2024 1:42 PM  Urgency: elective    Airway not difficult    General Information and Staff    Patient location during procedure: OR  Anesthesiologist: Sincere Danielle MD  Resident/CRNA: Nika Xavier CRNA  Performed: CRNA   Performed by: Nika Xavier CRNA  Authorized by: Sincere Danielle MD      Indications and Patient Condition  Indications for airway management: anesthesia  Spontaneous Ventilation: absent  Sedation level: deep  Preoxygenated: yes  Patient position: sniffing  Mask difficulty assessment: 1 - vent by mask    Final Airway Details  Final airway type: endotracheal airway      Successful airway: ETT  Cuffed: yes   Successful intubation technique: direct laryngoscopy  Facilitating devices/methods: intubating stylet  Endotracheal tube insertion site: oral  Blade: Dieudonne  Blade size: #3  ETT size (mm): 7.0    Cormack-Lehane Classification: grade I - full view of glottis  Placement verified by: capnometry   Measured from: lips  ETT to lips (cm): 21  Number of attempts at approach: 1

## 2024-09-18 NOTE — INTERVAL H&P NOTE
Pre-op Diagnosis: PELVIC PAIN, ENDO    The above referenced H&P was reviewed by Md Checo Stevenson MD on 9/18/2024, the patient was examined and no significant changes have occurred in the patient's condition since the H&P was performed.  I discussed with the patient and/or legal representative the potential benefits, risks and side effects of this procedure; the likelihood of the patient achieving goals; and potential problems that might occur during recuperation.  I discussed reasonable alternatives to the procedure, including risks, benefits and side effects related to the alternatives and risks related to not receiving this procedure.  We will proceed with procedure as planned.

## 2024-09-19 NOTE — OPERATIVE REPORT
[facility]    PATIENT'S NAME: DORCAS JAIN   ATTENDING PHYSICIAN: Checo Wu M.D.   OPERATING PHYSICIAN: Checo Wu M.D.   PATIENT ACCOUNT#:   [id number]    LOCATION:  [facility] 10  MEDICAL RECORD #:   [id number]       YOB: 1995  ADMISSION DATE:       09/18/2024      OPERATION DATE:  09/18/2024    OPERATIVE REPORT      PREOPERATIVE DIAGNOSIS:  Pelvic pain, endometriosis, interstitial cystitis.  POSTOPERATIVE DIAGNOSIS:  Pelvic pain, mild left-sided pelvic adhesions, probable interstitial cystitis, patent fallopian tubes.  PROCEDURE:  Examination under anesthesia, hysteroscopy, cystoscopy with hydrodistention, operative laparoscopy, chromopertubation, lysis of pelvic adhesions.    ASSISTANT SURGEON:  Rocío Alejandra MD.    ANESTHESIA:  Procedure performed under general endotracheal anesthesia.    ESTIMATED BLOOD LOSS:  10 mL.    COMPLICATIONS:  No complications.    FINDINGS:  On examination under anesthesia, normal-sized uterus.  Uterus sounds to 8 cm.  Hysteroscopy reveals normal uterine cavity.  Both tubal ostia were noted to be normal.  Cystoscopy with hydrodistention shows evidence of punctate hemorrhage throughout the bladder once the fluid was released.  On operative laparoscopy, there was noted to be a normal liver edge, gallbladder, normal appendix.  The uterus, tubes, and ovaries were all completely mobile.  The uterus, tubes, and ovaries all appeared normal.  On chromopertubation, bilateral fill and spill was noted.  There was absolutely no evidence of endometriosis.  The bowel was stuck somewhat high on the pelvic sidewall on the left consistent with pelvic adhesions.  These adhesions were taken down, and the sigmoid mobilized to its normal position.    OPERATIVE TECHNIQUE:  The patient was intubated for the purpose of general endotracheal anesthesia, prepped and draped in usual manner for laparoscopy.  Bladder catheterized.  Patient examined under  anesthesia.  Findings as noted above.  A weighted speculum introduced into the vaginal vault.  The anterior lip of the cervix was grasped with a single-tooth tenaculum.  Uterus sounds to 8 cm.  Hysteroscopy was now performed.  Normal uterine cavity noted, and a Jarcho cannula placed.    Attention was now directed toward the umbilicus.  The umbilicus was everted with an Allis clamp.  Two towel clamps placed on either side of the umbilicus.  A small rent was made.  Through this, a Veress needle was placed.  Insufflation started with carbon dioxide gas.  When the pressure reached 14 mmHg, the Veress needle was removed, replaced by laparoscopy trocar and sleeve.  One side trocar was removed, replaced by laparoscope.  Second and third punctures were now placed laterally under direct visualization.  Findings were as noted above.  No endometriosis was noted.  On chromopertubation, bilateral fill and spill was noted.  The only abnormality seen was the sigmoid being pulled anteriorly by adhesions.  These adhesions were mobilized.  At the end of procedure, cystoscopy with hydrodistention was performed.  Then, 600 mL of fluid was placed inside the bladder and left for 5 minutes.  Upon removal, one could see punctate hemorrhage throughout the bladder.  This is consistent with interstitial cystitis.  The patient will now be referred to Urogynecology for further treatment.  At the end of procedure, all instruments were removed, incision closed in the usual fashion.  Patient awakened, taken to recovery room in satisfactory condition.    Dictated By Checo Wu M.D.  d: 09/18/2024 15:00:04  t: 09/19/2024 00:21:42  Job [id number]  /

## 2025-04-11 RX ORDER — ELAGOLIX 150 MG/1
TABLET, FILM COATED ORAL DAILY
COMMUNITY

## 2025-04-29 ENCOUNTER — HOSPITAL ENCOUNTER (OUTPATIENT)
Facility: HOSPITAL | Age: 30
Setting detail: HOSPITAL OUTPATIENT SURGERY
Discharge: HOME | End: 2025-04-29
Attending: STUDENT IN AN ORGANIZED HEALTH CARE EDUCATION/TRAINING PROGRAM | Admitting: STUDENT IN AN ORGANIZED HEALTH CARE EDUCATION/TRAINING PROGRAM
Payer: COMMERCIAL

## 2025-04-29 ENCOUNTER — ANESTHESIA EVENT (OUTPATIENT)
Dept: SURGERY | Facility: HOSPITAL | Age: 30
End: 2025-04-29
Payer: COMMERCIAL

## 2025-04-29 ENCOUNTER — ANESTHESIA (OUTPATIENT)
Dept: SURGERY | Facility: HOSPITAL | Age: 30
End: 2025-04-29
Payer: COMMERCIAL

## 2025-04-29 VITALS
SYSTOLIC BLOOD PRESSURE: 133 MMHG | RESPIRATION RATE: 16 BRPM | BODY MASS INDEX: 22.66 KG/M2 | TEMPERATURE: 98 F | HEIGHT: 65 IN | WEIGHT: 136 LBS | HEART RATE: 64 BPM | DIASTOLIC BLOOD PRESSURE: 88 MMHG | OXYGEN SATURATION: 100 %

## 2025-04-29 DIAGNOSIS — Z98.890 S/P LAPAROSCOPY: Primary | ICD-10-CM

## 2025-04-29 LAB — B-HCG UR QL: NEGATIVE

## 2025-04-29 PROCEDURE — 88305 TISSUE EXAM BY PATHOLOGIST: CPT | Performed by: STUDENT IN AN ORGANIZED HEALTH CARE EDUCATION/TRAINING PROGRAM

## 2025-04-29 PROCEDURE — 81025 URINE PREGNANCY TEST: CPT

## 2025-04-29 RX ORDER — LABETALOL HYDROCHLORIDE 5 MG/ML
5 INJECTION, SOLUTION INTRAVENOUS EVERY 5 MIN PRN
Status: DISCONTINUED | OUTPATIENT
Start: 2025-04-29 | End: 2025-04-29

## 2025-04-29 RX ORDER — ROCURONIUM BROMIDE 10 MG/ML
INJECTION, SOLUTION INTRAVENOUS AS NEEDED
Status: DISCONTINUED | OUTPATIENT
Start: 2025-04-29 | End: 2025-04-29 | Stop reason: SURG

## 2025-04-29 RX ORDER — BUPIVACAINE HYDROCHLORIDE 5 MG/ML
INJECTION, SOLUTION EPIDURAL; INTRACAUDAL; PERINEURAL AS NEEDED
Status: DISCONTINUED | OUTPATIENT
Start: 2025-04-29 | End: 2025-04-29 | Stop reason: HOSPADM

## 2025-04-29 RX ORDER — DEXAMETHASONE SODIUM PHOSPHATE 4 MG/ML
VIAL (ML) INJECTION AS NEEDED
Status: DISCONTINUED | OUTPATIENT
Start: 2025-04-29 | End: 2025-04-29 | Stop reason: SURG

## 2025-04-29 RX ORDER — KETAMINE HYDROCHLORIDE 50 MG/ML
INJECTION, SOLUTION INTRAMUSCULAR; INTRAVENOUS AS NEEDED
Status: DISCONTINUED | OUTPATIENT
Start: 2025-04-29 | End: 2025-04-29 | Stop reason: SURG

## 2025-04-29 RX ORDER — HYDROMORPHONE HYDROCHLORIDE 1 MG/ML
0.6 INJECTION, SOLUTION INTRAMUSCULAR; INTRAVENOUS; SUBCUTANEOUS EVERY 5 MIN PRN
Status: DISCONTINUED | OUTPATIENT
Start: 2025-04-29 | End: 2025-04-29

## 2025-04-29 RX ORDER — ACETAMINOPHEN 500 MG
1000 TABLET ORAL ONCE
Status: DISCONTINUED | OUTPATIENT
Start: 2025-04-29 | End: 2025-04-29 | Stop reason: HOSPADM

## 2025-04-29 RX ORDER — ACETAMINOPHEN 500 MG
500 TABLET ORAL EVERY 6 HOURS PRN
Qty: 100 TABLET | Refills: 1 | Status: SHIPPED | OUTPATIENT
Start: 2025-04-29

## 2025-04-29 RX ORDER — LIDOCAINE HYDROCHLORIDE 10 MG/ML
INJECTION, SOLUTION EPIDURAL; INFILTRATION; INTRACAUDAL; PERINEURAL AS NEEDED
Status: DISCONTINUED | OUTPATIENT
Start: 2025-04-29 | End: 2025-04-29 | Stop reason: SURG

## 2025-04-29 RX ORDER — ACETAMINOPHEN 325 MG/1
650 TABLET ORAL EVERY 6 HOURS PRN
Status: CANCELLED | OUTPATIENT
Start: 2025-04-29

## 2025-04-29 RX ORDER — MEPERIDINE HYDROCHLORIDE 25 MG/ML
12.5 INJECTION INTRAMUSCULAR; INTRAVENOUS; SUBCUTANEOUS AS NEEDED
Status: DISCONTINUED | OUTPATIENT
Start: 2025-04-29 | End: 2025-04-29

## 2025-04-29 RX ORDER — IBUPROFEN 600 MG/1
600 TABLET, FILM COATED ORAL ONCE AS NEEDED
Status: DISCONTINUED | OUTPATIENT
Start: 2025-04-29 | End: 2025-04-29

## 2025-04-29 RX ORDER — KETOROLAC TROMETHAMINE 30 MG/ML
INJECTION, SOLUTION INTRAMUSCULAR; INTRAVENOUS AS NEEDED
Status: DISCONTINUED | OUTPATIENT
Start: 2025-04-29 | End: 2025-04-29 | Stop reason: SURG

## 2025-04-29 RX ORDER — SCOPOLAMINE 1 MG/3D
1 PATCH, EXTENDED RELEASE TRANSDERMAL ONCE
Status: DISCONTINUED | OUTPATIENT
Start: 2025-04-29 | End: 2025-04-29

## 2025-04-29 RX ORDER — HYDROMORPHONE HYDROCHLORIDE 1 MG/ML
0.4 INJECTION, SOLUTION INTRAMUSCULAR; INTRAVENOUS; SUBCUTANEOUS EVERY 5 MIN PRN
Status: DISCONTINUED | OUTPATIENT
Start: 2025-04-29 | End: 2025-04-29

## 2025-04-29 RX ORDER — HYDROCODONE BITARTRATE AND ACETAMINOPHEN 5; 325 MG/1; MG/1
1 TABLET ORAL EVERY 6 HOURS PRN
Refills: 0 | Status: CANCELLED | OUTPATIENT
Start: 2025-04-29

## 2025-04-29 RX ORDER — OXYCODONE HYDROCHLORIDE 5 MG/1
5 TABLET ORAL EVERY 4 HOURS PRN
Status: DISCONTINUED | OUTPATIENT
Start: 2025-04-29 | End: 2025-04-29

## 2025-04-29 RX ORDER — NALOXONE HYDROCHLORIDE 0.4 MG/ML
0.08 INJECTION, SOLUTION INTRAMUSCULAR; INTRAVENOUS; SUBCUTANEOUS AS NEEDED
Status: DISCONTINUED | OUTPATIENT
Start: 2025-04-29 | End: 2025-04-29

## 2025-04-29 RX ORDER — PHENAZOPYRIDINE HYDROCHLORIDE 200 MG/1
200 TABLET, FILM COATED ORAL ONCE
Status: COMPLETED | OUTPATIENT
Start: 2025-04-29 | End: 2025-04-29

## 2025-04-29 RX ORDER — DIPHENHYDRAMINE HYDROCHLORIDE 50 MG/ML
INJECTION, SOLUTION INTRAMUSCULAR; INTRAVENOUS AS NEEDED
Status: DISCONTINUED | OUTPATIENT
Start: 2025-04-29 | End: 2025-04-29 | Stop reason: SURG

## 2025-04-29 RX ORDER — ONDANSETRON 4 MG/1
4 TABLET, FILM COATED ORAL EVERY 8 HOURS PRN
Status: CANCELLED | OUTPATIENT
Start: 2025-04-29

## 2025-04-29 RX ORDER — SODIUM CHLORIDE, SODIUM LACTATE, POTASSIUM CHLORIDE, CALCIUM CHLORIDE 600; 310; 30; 20 MG/100ML; MG/100ML; MG/100ML; MG/100ML
INJECTION, SOLUTION INTRAVENOUS CONTINUOUS
Status: DISCONTINUED | OUTPATIENT
Start: 2025-04-29 | End: 2025-04-29

## 2025-04-29 RX ORDER — SENNA AND DOCUSATE SODIUM 50; 8.6 MG/1; MG/1
1 TABLET, FILM COATED ORAL DAILY
Qty: 100 TABLET | Refills: 1 | Status: SHIPPED | OUTPATIENT
Start: 2025-04-29

## 2025-04-29 RX ORDER — OXYCODONE HYDROCHLORIDE 5 MG/1
5 TABLET ORAL EVERY 4 HOURS PRN
Qty: 10 TABLET | Refills: 0 | Status: SHIPPED | OUTPATIENT
Start: 2025-04-29

## 2025-04-29 RX ORDER — HYDROCODONE BITARTRATE AND ACETAMINOPHEN 5; 325 MG/1; MG/1
2 TABLET ORAL EVERY 6 HOURS PRN
Refills: 0 | Status: CANCELLED | OUTPATIENT
Start: 2025-04-29

## 2025-04-29 RX ORDER — ONDANSETRON 4 MG/1
4 TABLET, FILM COATED ORAL EVERY 8 HOURS PRN
Qty: 10 TABLET | Refills: 0 | Status: SHIPPED | OUTPATIENT
Start: 2025-04-29

## 2025-04-29 RX ORDER — PROCHLORPERAZINE EDISYLATE 5 MG/ML
5 INJECTION INTRAMUSCULAR; INTRAVENOUS EVERY 8 HOURS PRN
Status: DISCONTINUED | OUTPATIENT
Start: 2025-04-29 | End: 2025-04-29

## 2025-04-29 RX ORDER — HYDROMORPHONE HYDROCHLORIDE 1 MG/ML
0.2 INJECTION, SOLUTION INTRAMUSCULAR; INTRAVENOUS; SUBCUTANEOUS EVERY 5 MIN PRN
Status: DISCONTINUED | OUTPATIENT
Start: 2025-04-29 | End: 2025-04-29

## 2025-04-29 RX ORDER — MIDAZOLAM HYDROCHLORIDE 1 MG/ML
INJECTION INTRAMUSCULAR; INTRAVENOUS AS NEEDED
Status: DISCONTINUED | OUTPATIENT
Start: 2025-04-29 | End: 2025-04-29 | Stop reason: SURG

## 2025-04-29 RX ORDER — IBUPROFEN 600 MG/1
600 TABLET, FILM COATED ORAL EVERY 6 HOURS PRN
Qty: 100 TABLET | Refills: 1 | Status: SHIPPED | OUTPATIENT
Start: 2025-04-29

## 2025-04-29 RX ORDER — ONDANSETRON 2 MG/ML
4 INJECTION INTRAMUSCULAR; INTRAVENOUS EVERY 8 HOURS PRN
Status: CANCELLED | OUTPATIENT
Start: 2025-04-29

## 2025-04-29 RX ORDER — MIDAZOLAM HYDROCHLORIDE 1 MG/ML
1 INJECTION INTRAMUSCULAR; INTRAVENOUS EVERY 5 MIN PRN
Status: DISCONTINUED | OUTPATIENT
Start: 2025-04-29 | End: 2025-04-29

## 2025-04-29 RX ORDER — ONDANSETRON 2 MG/ML
INJECTION INTRAMUSCULAR; INTRAVENOUS AS NEEDED
Status: DISCONTINUED | OUTPATIENT
Start: 2025-04-29 | End: 2025-04-29 | Stop reason: SURG

## 2025-04-29 RX ORDER — ONDANSETRON 2 MG/ML
4 INJECTION INTRAMUSCULAR; INTRAVENOUS EVERY 6 HOURS PRN
Status: DISCONTINUED | OUTPATIENT
Start: 2025-04-29 | End: 2025-04-29

## 2025-04-29 RX ORDER — IBUPROFEN 600 MG/1
600 TABLET, FILM COATED ORAL EVERY 6 HOURS
Status: CANCELLED | OUTPATIENT
Start: 2025-04-29

## 2025-04-29 RX ADMIN — DIPHENHYDRAMINE HYDROCHLORIDE 12.5 MG: 50 INJECTION, SOLUTION INTRAMUSCULAR; INTRAVENOUS at 07:58:00

## 2025-04-29 RX ADMIN — SODIUM CHLORIDE, SODIUM LACTATE, POTASSIUM CHLORIDE, CALCIUM CHLORIDE: 600; 310; 30; 20 INJECTION, SOLUTION INTRAVENOUS at 08:40:00

## 2025-04-29 RX ADMIN — LIDOCAINE HYDROCHLORIDE 100 MG: 10 INJECTION, SOLUTION EPIDURAL; INFILTRATION; INTRACAUDAL; PERINEURAL at 07:23:00

## 2025-04-29 RX ADMIN — KETOROLAC TROMETHAMINE 30 MG: 30 INJECTION, SOLUTION INTRAMUSCULAR; INTRAVENOUS at 08:18:00

## 2025-04-29 RX ADMIN — KETAMINE HYDROCHLORIDE 30 MG: 50 INJECTION, SOLUTION INTRAMUSCULAR; INTRAVENOUS at 07:45:00

## 2025-04-29 RX ADMIN — MIDAZOLAM HYDROCHLORIDE 2 MG: 1 INJECTION INTRAMUSCULAR; INTRAVENOUS at 07:17:00

## 2025-04-29 RX ADMIN — ROCURONIUM BROMIDE 50 MG: 10 INJECTION, SOLUTION INTRAVENOUS at 07:23:00

## 2025-04-29 RX ADMIN — DEXAMETHASONE SODIUM PHOSPHATE 8 MG: 4 MG/ML VIAL (ML) INJECTION at 07:32:00

## 2025-04-29 RX ADMIN — SODIUM CHLORIDE, SODIUM LACTATE, POTASSIUM CHLORIDE, CALCIUM CHLORIDE: 600; 310; 30; 20 INJECTION, SOLUTION INTRAVENOUS at 07:15:00

## 2025-04-29 RX ADMIN — ONDANSETRON 4 MG: 2 INJECTION INTRAMUSCULAR; INTRAVENOUS at 08:16:00

## 2025-04-29 NOTE — H&P
History and Physical     Ragini Calloway Patient Status:  Hospital Outpatient Surgery    6/10/1995 MRN [id number]   [facility] PERIOPERATIVE SERVICE Attending Adriana Dillard MD   Hosp Day # 0 PCP Dinesh Friend MD     Chief Complaint: scheduled surgery    Subjective:    Ragini Calloway is a 29 year old female with pelvic pain, endometriosis     History/Other:      Past Medical History:Past Medical History[1]     Past Surgical History: Past Surgical History[2]    Social History:  reports that she has quit smoking. Her smoking use included cigarettes. She has never used smokeless tobacco. She reports current alcohol use. She reports current drug use. Frequency: 3.00 times per week. Drug: Cannabis.    Family History: Family History[3]    Allergies: Allergies[4]    Medications:  Medications Ordered Prior to Encounter[5]    Review of Systems:   A comprehensive 14 point review of systems was completed.    Pertinent positives and negatives noted in the HPI.    Objective:     BP (!) 130/93 (BP Location: Right arm)   Pulse 63   Temp 98.7 °F (37.1 °C) (Temporal)   Resp 16   Ht 5' 5\" (1.651 m)   Wt 136 lb (61.7 kg)   LMP 2025 (Approximate)   SpO2 100%   BMI 22.63 kg/m²   General: No acute distress.  Alert ,         HEENT: Normocephalic atraumatic. Moist mucous membranes. EOM-I. PERRLA. Anicteric.  Neck: No lymphadenopathy. No JVD. No carotid bruits.  Respiratory: Clear to auscultation bilaterally. No wheezes. No rhonchi.    Cardiovascular: S1, S2. Regular rate and rhythm. No murmurs, rubs or gallops. Equal pulses.   Chest and Back: No tenderness or deformity.  Abdomen: Soft, nontender, nondistended.  Positive bowel sounds. No rebound, guarding or organomegaly.  Neurologic: No focal neurological deficits. CNII-XII grossly intact.  Musculoskeletal: Moves all extremities.  Extremities: No edema or cyanosis.  Psychiatric: Appropriate mood and affect.  Integument: No rashes or lesions.     Results:     Labs:    Selected labs - last 24 hours:  Endo  Lytes  Renal   Glu -  Na - Ca -  BUN -   POC Gluc  -  K - PO4 -  Cr -   A1c -  Cl - Mg -  eGFR -   TSH -  CO2 -         LFT  CBC  Other   AST -  WBC -  PTT - Procal -   ALT -  Hb -  INR - CRP -   APk -  Hct -  Trop - D dim -   T dori -  PLT -  pBNP -  BNP -  - Ferritin  -   Prot -    CK  - Lactate  -   Alb -    LDL  - COVID  -       Imaging: Imaging data reviewed in Epic.    Assessment & Plan:    #patient is here for scheduled surgery  -laparoscopy, excision of endometriosis     Quality:  DVT Mechanical Prophylaxis:   SCDs,    DVT Pharmacologic Prophylaxis   Medication   None                Code Status: Full Code  Flores: No urinary catheter in place  Flores Duration (in days):   Central line:    NASIR:     Plan of care discussed with patient     Adriana Dillard MD  4/29/2025    Supplementary Documentation:                                      [1]   Past Medical History:   Anxiety    Anxiety state, unspecified    Deliberate self-cutting    Depression    Essential hypertension    Hx of motion sickness    Pleurisy    PONV (postoperative nausea and vomiting)    Visual impairment    wears glasses and contacts   [2]   Past Surgical History:  Procedure Laterality Date    Cholecystectomy      Colonoscopy N/A 03/17/2020    Procedure: COLONOSCOPY;  Surgeon: Aaron Patrick MD;  Location: [facility] ENDOSCOPY    Colonoscopy      Upper gi endoscopy,exam     [3]   Family History  Problem Relation Age of Onset    Hypertension Father     Ovarian Cancer Maternal Grandmother     Diabetes Maternal Grandfather     Heart Disease Maternal Grandfather     Cancer Paternal Grandmother         skin, thyroid, uterine cancer   [4]   Allergies  Allergen Reactions    Radiology Contrast Iodinated Dyes SHORTNESS OF BREATH    Codeine NAUSEA AND VOMITING    Tramadol NAUSEA AND VOMITING    Iodine (Topical) RASH   [5]   No current facility-administered medications on file prior to encounter.     Current  Outpatient Medications on File Prior to Encounter   Medication Sig Dispense Refill    Elagolix Sodium (ORILISSA) 150 MG Oral Tab Take by mouth daily.

## 2025-04-29 NOTE — ANESTHESIA POSTPROCEDURE EVALUATION
[facility]    Ragini Calloway Patient Status:  Hospital Outpatient Surgery   Age/Gender 29 year old female MRN [id number]   Location [facility] POST ANESTHESIA CARE UNIT Attending Adriana Dillard MD   Hosp Day # 0 PCP Dinesh Friend MD       Anesthesia Post-op Note    LAPAROSCOPY, EXCISION OF ENDOMETRIOSIS, Chromotubation    Procedure Summary       Date: 04/29/25 Room / Location:  MAIN OR 06 /  MAIN OR    Anesthesia Start: 0715 Anesthesia Stop: 0852    Procedure: LAPAROSCOPY, EXCISION OF ENDOMETRIOSIS, Chromotubation (Abdomen) Diagnosis: (PELVIC PAIN; ENDOMETRIOSIS)    Surgeons: Adriana Dillard MD Anesthesiologist: Andrea Nielson MD    Anesthesia Type: general ASA Status: 2            Anesthesia Type: general    Vitals Value Taken Time   /73 04/29/25 08:46   Temp 36.6 04/29/25 08:52   Pulse 63 04/29/25 08:51   Resp 15 04/29/25 08:51   SpO2 100 % 04/29/25 08:51   Vitals shown include unfiled device data.        Patient Location: PACU    Anesthesia Type: general    Airway Patency: patent and extubated    Postop Pain Control: adequate    Mental Status: preanesthetic baseline    Nausea/Vomiting: none    Cardiopulmonary/Hydration status: stable euvolemic    Complications: no apparent anesthesia related complications    Postop vital signs: stable    Dental Exam: Unchanged from Preop    Patient to be discharged from PACU when criteria met.

## 2025-04-29 NOTE — DISCHARGE INSTRUCTIONS
Home Care Instructions Following Your Laparoscopic Procedure     Ragini-  We hope you were pleased with your care at [facility].  We wish you the best outcome and overall experience with your operation.  These instructions will help to minimize pain, promote healing, and improve the likelihood of a successful result.    What to Expect:  Expect some vaginal bleeding, abdominal cramping, and spotting for 1-2 weeks after your procedure  Use pads only. No tampons.  Call the office, if you experience heavy bleeding ( saturate one pad every hour)  You might experience mild-moderate upper back pain, shoulder pain, chest pain, and/or abdominal bloating from the gas used during your procedure. These symptoms can last up to 3 days.  Symptoms can be relieved by lying with your back elevated to 30 degrees.  Tenderness, burning, and/or a \"pulling\" sensation at the incisions sites are typical.  The pain can radiate to your hips, flanks, and/or groin.  This discomfort can last up to six weeks.  Pain with urination and bowel movements is typical.  This symptom can last up to two months  Fatigue, tiredness, or \"a washed out\" feeling is typical in the first few days following your procedure.  You might experience a temporary sore throat due to the breathing tube used for anesthesia during your operation.     Bandages (Dressing)  Your small abdominal incisions have been closed with absorbable sutures and white tapes (Steri-Strips).  There might be a clear plastic patch (Tegaderm) covering the tape. Blood-tinged oozing at the incision sites is to be expected.  Leave the Steri-Strips and Tegaderm in place, unless fluid accumulates under the Tegaderm  If the Steri-Strips and/or Tegaderm fall off, do not replace.    Remove the Tegaderm in one week, unless moisture or drainage collects under it.  Gently remove the Tegaderm, if you notice trapped moisture or drainage.  Do not replace.  Apply gauze as needed.  All dressings can be  removed 1 week after surgery.    Wound Care  Keep your incisions clean.  Pat dry after showers.    Do not use ointments, alcohol, or peroxide on the incisions.    Bathing/Showers  You can resume showering tomorrow  No baths, swimming, or hot tubs for 2 weeks     Pain Medication  Vicoprofen: Take one  tablet every 4-6 hours as needed for pain.  Do not exceed 5 (five) tablets each day  Do not take Vicoprofen, if you do not have pain.   Pain medication can cause constipation.  Use stool softeners, such as Milk of Magnesia or Colace.  Fruit ( prunes, apricots) can also help to prevent constipation      Other Prescription Medication  The following medication might have been prescribed for you:  Zofran is a medication which can help with nausea.  Take as prescribed  An antibiotic might also have been prescribed for you and is to be taken as directed    Over-The-Counter Medication  Non-prescription anti-inflammatory medications can also help to ease the pain.  You can take Aleve or ibuprofen in two days  Take as directed on the bottle  Drink a full glass of water with the medication. Do not take medication on an empty stomach.    Home Medication  Resume your home medications as instructed  You can resume your herbs and vitamins tomorrow    Diet  Anesthesia can slow your bowel motility.   Start a home with a liquid diet.  Resume a normal diet after you have passed gas.  Drink 16 glasses( 8 ounce) of fluid each day.     Activity  Refrain from vaginal intercourse, vaginal suppositories, tampon use, and douches for 2 weeks  No strenuous activity or heavy lifting( > 25 lbs.) for 3 weeks.  You can go up and down the stairs as tolerated.    You cannot return to work, if your work requires strenuous activity for 3 weeks  No physical exercise, sports, or gym workouts until you are allowed to participate in strenuous activity.  Walking at a normal pace is acceptable    Return to Work  You can return to work in 3-7 days, if your work  is considered sedentary and does not involve strenuous activity.  Do not return to work for 3 weeks, if your job involves strenuous activity  Contact your doctor's office, if you need a medical note.     Driving  Refrain from driving for one week  Do not drive if you are  taking pain medication.    Follow-up Appointment with Your Gynecologist  Call the office tomorrow for an appointment in 2 weeks, if you are a patient of   Call the office tomorrow for an appointment in 4-6 weeks, if you are a fertility patient or a patient with pelvic pain of Dr. Wu.  Verify your appointment date, day, time, and location.  At your 1st postoperative office visit:  Your wounds will be evaluated, healing assessed, and any additional concerns and instructions will be discussed.    Flores/Catheter Removal   If you had a Flores Catheter placed prior to your hospital discharge, remove Flores Catheter in the early morning, the day after surgery (Post-Op Day 1). If you are unable to void within 6 hours of removing catheter, contact office immediately.    Questions or Concerns  Call the office if you experience severe pain not controlled by pain medication, swelling, persistent vomiting,  incisional redness/warmth/yellowish discharge, bleeding, fever > 100.4, shortness of breath, difficulty breathing, moderate-severe calf pain, difficulty urinating, or other concerns.  If your call is made after office hours, a physician's assistant or fellow will be available to help you.  There is always a doctor from the practice who is covering the patient calls.      You received a drug called Toradol which is an Anti Inflammatory at: 8:18AM  If you are allowed to take Anti inflammatories:    Do not take any Anti Inflammatory like Motrin, Aleve or Ibuprophen until after: 2:18PM  Please report any suspected allergic reactions or bleeding issues to your doctor

## 2025-04-29 NOTE — ANESTHESIA PREPROCEDURE EVALUATION
PRE-OP EVALUATION    Patient Name: Ragini Calloway    Admit Diagnosis: PELVIC PAIN; ENDOMETRIOSIS    Pre-op Diagnosis: PELVIC PAIN; ENDOMETRIOSIS    LAPAROSCOPY, EXCISION OF ENDOMETRIOSIS    Anesthesia Procedure: LAPAROSCOPY, EXCISION OF ENDOMETRIOSIS    Surgeons and Role:     * Adriana Dillard MD - Primary    Pre-op vitals reviewed.  Temp: 98.7 °F (37.1 °C)  Pulse: 63  Resp: 16  BP: 130/93  SpO2: 100 %  Body mass index is 22.63 kg/m².    Current medications reviewed.  Hospital Medications:  Current Medications[1]    Outpatient Medications:   Prescriptions Prior to Admission[2]    Allergies: Radiology contrast iodinated dyes, Codeine, Tramadol, and Iodine (topical)      Anesthesia Evaluation    Patient summary reviewed.    Anesthetic Complications  (+) history of anesthetic complications  History of: PONV       GI/Hepatic/Renal                                 Cardiovascular                  (+) hypertension                                     Endo/Other                                  Pulmonary                           Neuro/Psych      (+) depression  (+) anxiety                            Past Surgical History[3]  Social Hx on file[4]  History   Drug Use   • Frequency: 3.0 times per week   • Types: Cannabis     Available pre-op labs reviewed.               Airway      Mallampati: I  Mouth opening: >3 FB  TM distance: > 6 cm  Neck ROM: full Cardiovascular    Cardiovascular exam normal.  Rhythm: regular  Rate: normal     Dental             Pulmonary    Pulmonary exam normal.                 Other findings        ASA: 2   Plan: general  NPO status verified and patient meets guidelines.          Plan/risks discussed with: patient and spouse            Present on Admission:  **None**               [1]  • acetaminophen (Tylenol Extra Strength) tab 1,000 mg  1,000 mg Oral Once   • scopolamine (Transderm-Scop) 1 MG/3DAYS patch 1 patch  1 patch Transdermal Once   • lactated ringers infusion   Intravenous Continuous   •  [COMPLETED] phenazopyridine (Pyridium) tab 200 mg  200 mg Oral Once   [2]  Medications Prior to Admission   Medication Sig Dispense Refill Last Dose/Taking   • Elagolix Sodium (ORILISSA) 150 MG Oral Tab Take by mouth daily.   More than a month   [3]  Past Surgical History:  Procedure Laterality Date   • Cholecystectomy     • Colonoscopy N/A 03/17/2020    Procedure: COLONOSCOPY;  Surgeon: Aaron Patrick MD;  Location: [facility] ENDOSCOPY   • Colonoscopy     • Upper gi endoscopy,exam     [4]  Social History  Socioeconomic History   • Marital status: Single   Tobacco Use   • Smoking status: Former     Types: Cigarettes   • Smokeless tobacco: Never   Vaping Use   • Vaping status: Never Used   Substance and Sexual Activity   • Alcohol use: Yes     Comment: occasionally   • Drug use: Yes     Frequency: 3.0 times per week     Types: Cannabis   Other Topics Concern   • Caffeine Concern No     Comment: Soda, occasional   • Exercise No

## 2025-04-29 NOTE — ANESTHESIA PROCEDURE NOTES
Airway  Date/Time: 4/29/2025 7:26 AM  Reason: elective    Airway not difficult    General Information and Staff   Patient location during procedure: OR  Anesthesiologist: Andrea Nielson MD  Resident/CRNA: Elvira Vargas CRNA  Performed: CRNA   Performed by: Elvira Vargas CRNA  Authorized by: Andrea Nielson MD        Indications and Patient Condition  Indications for airway management: anesthesia  Sedation level: deep      Preoxygenated: yesPatient position: sniffing    Mask difficulty assessment: 1 - vent by mask    Final Airway Details    Final airway type: endotracheal airway    Successful airway: ETT  Cuffed: yes   Successful intubation technique: direct laryngoscopy  Endotracheal tube insertion site: oral  Blade: Dieudonne  Blade size: #3  ETT size (mm): 7.0    Cormack-Lehane Classification: grade I - full view of glottis  Placement verified by: capnometry   Measured from: lips  ETT to lips (cm): 21  Number of attempts at approach: 1    Additional Comments  Atraumatic intubation. Dentition and OP as per preop.

## 2025-04-29 NOTE — OPERATIVE REPORT
PREOPERATIVE DIAGNOSIS:  Pelvic Pain, endometriosis    POSTOPERATIVE DIAGNOSIS: Same as above  PROCEDURE PERFORMED:  Laparoscopy,chromopertubation, excision of endometriosis     SURGEON: Adriana Dillard MD     ASSISTANT: Sayda LEONG    ANESTHESIA:  General.    COMPLICATIONS:  None.    ESTIMATED BLOOD LOSS:  5 mL.    SPECIMENS: endometriosis    DISPOSITION:  Stable to recovery room.    FINDINGS:  Grossly normal liver, gallbladder, bilateral tubes and ovaries.  Superficial endometriosis noted in right perirectal space   Bilateral patent fallopian tubes       PROCEDURE:  The patient was taken back to the operating room with the IV running.  Once general anesthesia was placed and found to be adequate, patient was then placed in the dorsal lithotomy position.  The patient was then prepped and draped in the usual sterile fashion.  A Flores catheter was then placed in the bladder and noted to be draining clear yellow urine.  A weighted speculum was placed in the vagina and the cervix was visualized with a right angle retractor. A single tooth tenaculum was placed on the anterior lip of the uterine cervix and the uterus sounded to approximately 7 cm.  A "[employer]" uterine manipulator was introduced into the uterine cavity and attached to the single-tooth tenaculum.      Attention was then turned to the patient's abdomen where the base of the umbilicus was grasped with a Kocher clamp and everted.  Two towel clips were placed periumbilically.  Sensorcaine 0.5% injected and a 5 mm skin incision made.  While tenting the abdominal wall with the towel clips, the Veress needle was introduced into the abdominal cavity where intraabdominal placement was confirmed by a drop of intraabdominal pressure while insufflating with CO2 gas.  Once adequate pneumoperitoneum was obtained, the 5mm trocar and sleeve were then introduced into the abdominal cavity where intraabdominal placement was confirmed with a laparoscope.  A survey of the patient's  upper abdomen and pelvis revealed the above-mentioned findings.  The patient was placed in Trendelenburg position to help pack away the bowel into the upper abdomen.  The inferior epigastric vessels were identified in the bilateral lower quadrants.  Lateral to these vessels, 0.5% Sensorcaine was injected, 5-mm incisions made and 5-mm trocar and sleeves introduced under direct visualization without any difficulty.     At this time the pelvis was closely evaluated and endometriosis was noted A rectal probe was placed and the right perirectal space was opened with cold scissors. The bowel was noted to be far away and the endometriosis was excised with soniscision. Next a chromopertubation was performed and bilateral spill of blue dye was noted from both fallopian tubes.     At this time a low pressure check was performed to a pressure of 5mm Hg and good hemostasis was noted.  All instruments and CO2 were removed from the abdomen and all skin incisions were approximated with 4-0 Monocryl and Steri-Strips and Tegaderm placed over the umbilical incision.  The vaginal instrumentation was removed from the patient's vagina.  No active bleeding noted from the tenaculum site.  Flores catheter removed at the end of procedure.  The patient tolerated the procedure well.    All sponge, lap, needle and instrument counts were correct x2.

## 2025-08-08 ENCOUNTER — TELEPHONE (OUTPATIENT)
Dept: OBGYN CLINIC | Facility: CLINIC | Age: 30
End: 2025-08-08

## (undated) DIAGNOSIS — R10.11 RUQ PAIN: Primary | ICD-10-CM

## (undated) DIAGNOSIS — G96.00 CSF LEAK: Primary | ICD-10-CM

## (undated) DIAGNOSIS — K82.9 GALLBLADDER DISORDER: Primary | ICD-10-CM

## (undated) DEVICE — EXOFIN TISSUE ADHESIVE 1.0ML

## (undated) DEVICE — GLOVE SUR 7.5 SENSICARE PI PIP CRM PWD F

## (undated) DEVICE — CURVED JAW CORDLESS ULTRASONIC DISSECTOR: Brand: SONICISION 7

## (undated) DEVICE — LIGAMAX 5 MM ENDOSCOPIC MULTIPLE CLIP APPLIER: Brand: LIGAMAX

## (undated) DEVICE — GYN LAP/ROBOTIC: Brand: MEDLINE INDUSTRIES, INC.

## (undated) DEVICE — SET TB INFLO FOR TRUCLEAR SYS HYSTEROLUX

## (undated) DEVICE — 3 ML SYRINGE LUER-LOCK TIP: Brand: MONOJECT

## (undated) DEVICE — SUT MCRYL 4-0 18IN PS-2 ABSRB UD 19MM 3/8 CIR

## (undated) DEVICE — GAMMEX® PI HYBRID SIZE 7.5, STERILE POWDER-FREE SURGICAL GLOVE, POLYISOPRENE AND NEOPRENE BLEND: Brand: GAMMEX

## (undated) DEVICE — 40580 - THE PINK PAD - ADVANCED TRENDELENBURG POSITIONING KIT: Brand: 40580 - THE PINK PAD - ADVANCED TRENDELENBURG POSITIONING KIT

## (undated) DEVICE — ABDOMINAL BINDER: Brand: DEROYAL

## (undated) DEVICE — TISSUE RETRIEVAL SYSTEM: Brand: INZII RETRIEVAL SYSTEM

## (undated) DEVICE — LINE MNTR ADLT SET O2 INTMD

## (undated) DEVICE — Device: Brand: CUSTOM PROCEDURE KIT

## (undated) DEVICE — PLUMEPORT ACTIV LAPAROSCOPIC SMOKE FILTRATION DEVICE: Brand: PLUMEPORT ACTIVE

## (undated) DEVICE — TROCAR: Brand: KII FIOS FIRST ENTRY

## (undated) DEVICE — LAPAROVUE VISIBILITY SYSTEM LAPAROSCOPIC SOLUTIONS: Brand: LAPAROVUE

## (undated) DEVICE — SUTURE VICRYL 0 UR-6

## (undated) DEVICE — REM POLYHESIVE ADULT PATIENT RETURN ELECTRODE: Brand: VALLEYLAB

## (undated) DEVICE — ADHESIVE SKIN TOP FOR WND CLSR DERMBND ADV

## (undated) DEVICE — 3M™ TEGADERM™ +PAD FILM DRESSING WITH NON-ADHERENT PAD, 3587, 3-1/2 IN X 4-1/8 IN (9 CM X 10.5 CM), 25/CAR, 4 CAR/CS: Brand: 3M™ TEGADERM™

## (undated) DEVICE — ENDOPATH 5MM CURVED SCISSORS WITH MONOPOLAR CAUTERY: Brand: ENDOPATH

## (undated) DEVICE — LAP CHOLE: Brand: MEDLINE INDUSTRIES, INC.

## (undated) DEVICE — SNARE CAPTIFLEX MICRO-OVL OLY

## (undated) DEVICE — SOL  .9 1000ML BAG

## (undated) DEVICE — SPECIMEN SOCK - STANDARD: Brand: MEDI-VAC

## (undated) DEVICE — [HIGH FLOW INSUFFLATOR,  DO NOT USE IF PACKAGE IS DAMAGED,  KEEP DRY,  KEEP AWAY FROM SUNLIGHT,  PROTECT FROM HEAT AND RADIOACTIVE SOURCES.]: Brand: PNEUMOSURE

## (undated) DEVICE — TROCAR: Brand: KII® SLEEVE

## (undated) DEVICE — CORE REPOSABLE TRUMPET FOR SINGLE SOLUTION BAG: Brand: CORE DYNAMICS

## (undated) DEVICE — Device: Brand: SUTURE PASSOR PRO

## (undated) DEVICE — DECANTER BAG 9": Brand: MEDLINE INDUSTRIES, INC.

## (undated) DEVICE — MEDI-VAC NON-CONDUCTIVE SUCTION TUBING 6MM X 1.8M (6FT.) L: Brand: CARDINAL HEALTH

## (undated) DEVICE — SOLUTION PREP 26ML 0.7% POVACRYLEX 74% ISO

## (undated) DEVICE — ADHESIVE LIQ 2/3ML VI MASTISOL

## (undated) DEVICE — GLOVE SUR 7 SENSICARE PI PIP CRM PWD F

## (undated) DEVICE — TROCARS: Brand: KII® BALLOON BLUNT TIP SYSTEM

## (undated) DEVICE — FORCEPS BPLR L33CM DISECT TWO TIER JAW DSGN

## (undated) DEVICE — ENDOCUT SCISSOR TIP, DISPOSABLE: Brand: RENEW

## (undated) DEVICE — DISPOSABLE SUCTION/IRRIGATOR TUBE SET: Brand: AHTO

## (undated) DEVICE — UNDYED BRAIDED (POLYGLACTIN 910), SYNTHETIC ABSORBABLE SUTURE: Brand: COATED VICRYL

## (undated) DEVICE — SOLUTION IRRIG 1000ML 0.9% NACL USP BTL

## (undated) DEVICE — 6 ML SYRINGE LUER-LOCK TIP: Brand: MONOJECT

## (undated) DEVICE — GLOVE SUR 6 SENSICARE PI PIP CRM PWD F

## (undated) DEVICE — SET TUBING DELTER CYSTO IRRIG L77IN DIA0.241IN BLDR NVENT

## (undated) DEVICE — Device: Brand: DEFENDO AIR/WATER/SUCTION AND BIOPSY VALVE

## (undated) DEVICE — SOL H2O 1000ML BTL

## (undated) DEVICE — SOLUTION DURAPREP 26ML APPLICATOR

## (undated) DEVICE — LACTATED R 1000ML INJ

## (undated) DEVICE — MEGADYNE E-Z CLEAN BLADE 2.75"

## (undated) DEVICE — SOLUTION IRRIG 3000ML 0.9% NACL FLX CONT

## (undated) DEVICE — GAMMEX® NON-LATEX SIZE 6.5, STERILE NEOPRENE POWDER-FREE SURGICAL GLOVE: Brand: GAMMEX

## (undated) DEVICE — LACTATED R INJ

## (undated) DEVICE — FORCEP RADIAL JAW 4

## (undated) DEVICE — INSUFFLATION NEEDLE TO ESTABLISH PNEUMOPERITONEUM.: Brand: INSUFFLATION NEEDLE

## (undated) DEVICE — SOL  .9 1000ML BTL

## (undated) DEVICE — 35 ML SYRINGE REGULAR TIP: Brand: MONOJECT

## (undated) DEVICE — 3M™ STERI-STRIP™ REINFORCED ADHESIVE SKIN CLOSURES, R1547, 1/2 IN X 4 IN (12 MM X 100 MM), 6 STRIPS/ENVELOPE: Brand: 3M™ STERI-STRIP™

## (undated) NOTE — ED AVS SNAPSHOT
[facility] Emergency Department    [address]     [place] [address]    Phone:  [phone]    Fax:  [phone]           Joss Saldana   MRN: [id number]    Department:  [facility] Emergency Department   Date of Visit:  2/23/ It is our goal to assure that you are completely satisfied with every aspect of your visit today.   In an effort to constantly improve our service to you, we would appreciate any positive or negative feedback related to the care you received in our emergenc [employer] account. You may have had testing done that requires us to contact you. Please make sure we have your correct phone number on file.       I certified that I have received a copy of the aftercare instructions; that these instructions have been expl If you have questions, you can call [phone] to talk to our [facility] Staff. Remember, [employer] is NOT to be used for urgent needs. For medical emergencies, dial 911. Visit [email or web address] to learn more.

## (undated) NOTE — MR AVS SNAPSHOT
[address]  [address] [facility]  [phone]               Thank you for choosing us for your health care visit with Alysha Client, .   We are glad to serve you and happy to provide you with this summary These medications were sent to [address], [address] AT [address], [phone], [phone] [address] [address]     Phone:  [phone]    - Wendy Cheek

## (undated) NOTE — LETTER
April 4, 2017    Patient: Paresh Camacho   Date of Visit: 4/4/2017       To Whom It May Concern:    Roddy Kennedy was seen and treated in our emergency department on 4/4/2017. She can return to work.     If you have any questions or concerns, please don'

## (undated) NOTE — LETTER
12/18/2018              [address] [place] [address]         To whom it may concern,    Debi Mack is currently a patient under my medical care.   She has de Quervain's tenosynovitis of the left wrist.

## (undated) NOTE — MR AVS SNAPSHOT
[address]  [address] [facility]  [phone]               Thank you for choosing us for your health care visit with Leighann Quiles DO.   We are glad to serve you and happy to provide you with this summary [address] [address]     Phone:  [phone]    - escitalopram 20 MG Tabs      You can get these medications from any pharmacy     Bring a paper prescription for each of these medications    - alprazolam 0.25 MG Tabs

## (undated) NOTE — ED AVS SNAPSHOT
Rashmi Vaughn   MRN: [id number]    Department:  [facility] Emergency Department   Date of Visit:  10/9/2017           Disclosure     Insurance plans vary and the physician(s) referred by the ER may not be covered by your plan.  Please contact y CARE PHYSICIAN AT ONCE OR RETURN IMMEDIATELY TO THE EMERGENCY DEPARTMENT. If you have been prescribed any medication(s), please fill your prescription right away and begin taking the medication(s) as directed.   If you believe that any of the medications

## (undated) NOTE — LETTER
OUTSIDE TESTING RESULT REQUEST     IMPORTANT: FOR YOUR IMMEDIATE ATTENTION  Please FAX all test results listed below to: [phone]     Testing already done on or about: 2025     * * * * If testing is NOT complete, arrange with patient A.S.A.P. * * * *      Patient Name: Ragini Calloway  Surgery Date: 2025  Medical Record: [id number]  CSN: [id number]  : 6/10/1995 - A: 29 y     Sex: female  Surgeon(s):  Adriana Dillard MD  Procedure: LAPAROSCOPY, EXCISION OF ENDOMETRIOSIS  Anesthesia Type: General     Surgeon: Adriana Dillard MD     The following Testing and Time Line are REQUIRED PER ANESTHESIA     CBC [with Differential & Platelets] within  90 days  CMP (requires 4 hour fast) within  90 days  Hepatitis B Antigen   Hepatitis C (HCV Antibody)  HIV 1/2 Single Assay   bHCG Quant within 30 days      Thank You,   Sent by: SARA Vu

## (undated) NOTE — MR AVS SNAPSHOT
[address]  1200 [facility]  [phone]               Thank you for choosing us for your health care visit with Melissa Pablo DO.   We are glad to serve you and happy to provide you with this summary [address] [address]     Phone:  [phone]    - Sertraline HCl 100 MG Tabs      You can get these medications from any pharmacy     Bring a paper prescription for each of these medications    - LORazepam 0.5 MG Tab

## (undated) NOTE — ED AVS SNAPSHOT
[facility] Emergency Department    [address]     [place] [address]    Phone:  [phone]    Fax:  [phone]           Hector Wilkinsonha   MRN: [id number]    Department:  [facility] Emergency Department   Date of Visit:  3/8/2 and Class Registration line at [phone] or find a doctor online by visiting [email or web address].    IF THERE IS ANY CHANGE OR WORSENING OF YOUR CONDITION, CALL YOUR PRIMARY CARE PHYSICIAN AT ONCE OR RETURN IMMEDIATELY TO [address] [place] [address].     If

## (undated) NOTE — ED AVS SNAPSHOT
[facility] Emergency Department    [address]     [place] [address]    Phone:  [phone]    Fax:  [phone]           Amee Nolen   MRN: [id number]    Department:  [facility] Emergency Department   Date of Visit:  6/13/ Insurance plans vary and the physician(s) referred by the ER may not be covered by your plan. Please contact your insurance company to determine coverage and benefits available for follow-up care and referrals.       If you have difficulty scheduling your prescription right away and begin taking the medication(s) as directed.   If you believe that any of the medications or instructions on this list is different from what your Primary Care doctor has instructed you - please continue to take your medications a Patient [address] to help you get signed up for insurance coverage. Patient [address] is a Federal Navigator program that can help with your Affordable Care Act coverage, as well as all types of Medicaid plans.   To get signed up and covere

## (undated) NOTE — ED AVS SNAPSHOT
Scott Klein   MRN: [id number]    Department:  [facility] Emergency Department   Date of Visit:  10/25/2019           Disclosure     Insurance plans vary and the physician(s) referred by the ER may not be covered by your plan.  Please contact CARE PHYSICIAN AT ONCE OR RETURN IMMEDIATELY TO THE EMERGENCY DEPARTMENT. If you have been prescribed any medication(s), please fill your prescription right away and begin taking the medication(s) as directed.   If you believe that any of the medications

## (undated) NOTE — ED AVS SNAPSHOT
[facility] Emergency Department    [address] [place] [address]    Phone:  [phone]    Fax:  [phone]           Amy Reyna   MRN: [id number]    Department:  [facility] Emergency Department   Date of Visit:  4/26/ and Class Registration line at [phone] or find a doctor online by visiting [email or web address].    IF THERE IS ANY CHANGE OR WORSENING OF YOUR CONDITION, CALL YOUR PRIMARY CARE PHYSICIAN AT ONCE OR RETURN IMMEDIATELY TO [address] [place] [address].     If

## (undated) NOTE — LETTER
April 4, 2017    Patient: Kathrine Beyer   Date of Visit: 4/4/2017       To Whom It May Concern:    Maria Del Carmen Oconnor was seen and treated in our emergency department on 4/4/2017. She can return to work on 4/4/17.     If you have any questions or concerns, p

## (undated) NOTE — ED AVS SNAPSHOT
[facility] Emergency Department    [address]     [place] [address]    Phone:  [phone]    Fax:  [phone]           Makeda Son   MRN: [id number]    Department:  [facility] Emergency Department   Date of Visit:  4/4/2 and Class Registration line at [phone] or find a doctor online by visiting [email or web address].    IF THERE IS ANY CHANGE OR WORSENING OF YOUR CONDITION, CALL YOUR PRIMARY CARE PHYSICIAN AT ONCE OR RETURN IMMEDIATELY TO [address] [place] [address].     If

## (undated) NOTE — LETTER
11/28/2018          To Whom It May Concern:    Roberto Bergman is currently under my medical care and may not return to work at this time. Please excuse Chino Haile for 1 days. She may return to work on 11/29/2018. Activity is restricted as follows: none.

## (undated) NOTE — LETTER
1/9/2018              [facility] [address]        [place] [address]         Dear Ranulfo Spaulding,    This letter is to inform you that our office has made several attempts to reach you by phone without success.   We were attempting to con

## (undated) NOTE — MR AVS SNAPSHOT
[address]  [address] [facility]  [phone]               Thank you for choosing us for your health care visit with Denis Hoffman DO.   We are glad to serve you and happy to provide you with this summary If you have questions, you can call [phone] to talk to our [facility] Staff. Remember, [employer] is NOT to be used for urgent needs. For medical emergencies, dial 911. Visit [email or web address] to learn more.            Visit [employer]WARD[employer]

## (undated) NOTE — LETTER
OUTSIDE TESTING RESULT REQUEST     IMPORTANT: FOR YOUR IMMEDIATE ATTENTION  Please FAX all test results listed below to: [phone]       * * * * If testing is NOT complete, arrange with patient A.S.A.P. * * * *      Patient Name: Ragini Calloway  Surgery Date: 2024  Medical Record: [id number]  CSN: [id number]  : 6/10/1995 - A: 29 y     Sex: female  Surgeon(s):  Bryce Gooden, MD Justyn Ovalles Kirsten, MD  Procedure: HYSTEROSCOPY, LAPAROSCOPY, EXCISION OF ENDOMETRIOSIS, LYSIS OF ADHESIONS  Anesthesia Type: General     Surgeon: Bryce Gooden, Checo CLIFTON MD     The following Testing and Time Line are REQUIRED PER ANESTHESIA     CBC [with Differential & Platelets] within  30 days  CMP (requires 4 hour fast) within  90 days  Hepatitis B Antigen   Hepatitis C (HCV Antibody)  HIV 1/2 Single Assay   [facility]      Thank You,   Sent by: SUDHIR CASTELLANO

## (undated) NOTE — MR AVS SNAPSHOT
[address]  [address] [facility]  [phone]               Thank you for choosing us for your health care visit with Phyllis Donovan MD.  We are glad to serve you and happy to provide you with this summ MyChart     Visit [employer]  You can access your MyChart to more actively manage your health care and view more details from this visit by going to [email or web address].   If you've recently had a stay at the Hospital you can access yo

## (undated) NOTE — ED AVS SNAPSHOT
Debi Mack   MRN: [id number]    Department:  [facility] Emergency Department   Date of Visit:  11/11/2017           Disclosure     Insurance plans vary and the physician(s) referred by the ER may not be covered by your plan.  Please contact CARE PHYSICIAN AT ONCE OR RETURN IMMEDIATELY TO THE EMERGENCY DEPARTMENT. If you have been prescribed any medication(s), please fill your prescription right away and begin taking the medication(s) as directed.   If you believe that any of the medications

## (undated) NOTE — ED AVS SNAPSHOT
Roberto Bergman   MRN: [id number]    Department:  [facility] Emergency Department   Date of Visit:  12/29/2017           Disclosure     Insurance plans vary and the physician(s) referred by the ER may not be covered by your plan.  Please contact CARE PHYSICIAN AT ONCE OR RETURN IMMEDIATELY TO THE EMERGENCY DEPARTMENT. If you have been prescribed any medication(s), please fill your prescription right away and begin taking the medication(s) as directed.   If you believe that any of the medications

## (undated) NOTE — ED AVS SNAPSHOT
[facility] Emergency Department    [address]     [place] [address]    Phone:  [phone]    Fax:  [phone]           Liya Clinton   MRN: [id number]    Department:  [facility] Emergency Department   Date of Visit:  2/23/ and Class Registration line at [phone] or find a doctor online by visiting [email or web address].    IF THERE IS ANY CHANGE OR WORSENING OF YOUR CONDITION, CALL YOUR PRIMARY CARE PHYSICIAN AT ONCE OR RETURN IMMEDIATELY TO [address] [place] [address].     If

## (undated) NOTE — LETTER
Date & Time: 10/25/2019, 6:35 PM  Patient: Eben Obregon  Encounter Provider(s):    Holly Gomez MD       To Whom It May Concern:    Richelle Conroy was seen and treated in our department on 10/25/2019. She should not return to work until 10/27/2019.

## (undated) NOTE — ED AVS SNAPSHOT
[facility] Emergency Department    [address]     [place] [address]    Phone:  [phone]    Fax:  [phone]           Makenzie Womack   MRN: [id number]    Department:  [facility] Emergency Department   Date of Visit:  4/4/2 covered by your plan. Please contact your insurance company to determine coverage and benefits available for follow-up care and referrals.       If you have difficulty scheduling your follow-up appointment as directed, please call our  at [phone]) If you believe that any of the medications or instructions on this list is different from what your Primary Care doctor has instructed you - please continue to take your medications as instructed by your Primary Care doctor until you can check with your do coverage. Patient [address] is a Federal Navigator program that can help with your Affordable Care Act coverage, as well as all types of Medicaid plans.   To get signed up and covered, please call [phone] and ask to get set up for an insuran

## (undated) NOTE — ED AVS SNAPSHOT
Amy Reyna   MRN: [id number]    Department:  [facility] Emergency Department   Date of Visit:  4/18/2019           Disclosure     Insurance plans vary and the physician(s) referred by the ER may not be covered by your plan.  Please contact y CARE PHYSICIAN AT ONCE OR RETURN IMMEDIATELY TO THE EMERGENCY DEPARTMENT. If you have been prescribed any medication(s), please fill your prescription right away and begin taking the medication(s) as directed.   If you believe that any of the medications

## (undated) NOTE — ED AVS SNAPSHOT
[facility] Emergency Department    [address]     [place] [address]    Phone:  [phone]    Fax:  [phone]           Maeknzie Womack   MRN: [id number]    Department:  [facility] Emergency Department   Date of Visit:  4/26/ please call our  at [phone]. Si tiene problemas para programar connie radha de seguimiento según lo indicado, llame al encargado de martell al [phone].     It is our goal to assure that you are completely satisfied with every aspect o doctor until you can check with your doctor. Please bring the medication list to your next doctor's appointment. Any imaging studies and labs completed today can be reviewed in your simplifyMDhart account.   You may have had testing done that requires us to co and ask to get set up for an insurance coverage that is in-network with [employer]en Love.         MyChart     Visit [employer]  You can access your Socialtexthart to more actively manage your health care and view more details from this visit by going to https:/

## (undated) NOTE — MR AVS SNAPSHOT
After Visit Summary   9/3/2019    Osmar Vanegas    MRN: [id number]           Visit Information     Date & Time  9/3/2019  3:00 PM Provider  Silvana Wooten MD [address] [place] [address], [address] [place] [address],[address], [facility]/[employer].  Phone  [phone] 9/3/2020    THINPREP PAP WITH HPV REFLEX REQUEST [[id number] CUSTOM]  9/3/2019 9/3/2020                [facility] now offers Video Visits through [address] for adult and pediatric patients.   Video Visits are available Monday - Friday for many common conditions such as EMERGENCY ROOM         Life-threatening emergencies needing immediate intervention   at a hospital emergency room.       Average cost  $2,300*   *Cost varies based on your insurance coverage  For more information about hours, locations or appointment option

## (undated) NOTE — ED AVS SNAPSHOT
[facility] Emergency Department    [address]     [place] [address]    Phone:  [phone]    Fax:  [phone]           Osmar Romina   MRN: [id number]    Department:  [facility] Emergency Department   Date of Visit:  6/13/ and Class Registration line at [phone] or find a doctor online by visiting [email or web address].    IF THERE IS ANY CHANGE OR WORSENING OF YOUR CONDITION, CALL YOUR PRIMARY CARE PHYSICIAN AT ONCE OR RETURN IMMEDIATELY TO [address] [place] [address].     If

## (undated) NOTE — LETTER
10/25/2017              [address] [place] [address] STEPH ADAMS [address] [place] [address]         To whom it may concern,    Ibis Sarabiaadityananette is currently a patient under my medical care. Patient can return back to work on 10/26/2017 as she requests.

## (undated) NOTE — MR AVS SNAPSHOT
[address]  [address] [facility]  [phone]               Thank you for choosing us for your health care visit with Anamika Elizalde DO.   We are glad to serve you and happy to provide you with this summary office, you can view your past visit information in [employer] by going to Visits < Visit Summaries. Healcerion questions? Call [phone] for help. He[employer]ion is NOT to be used for urgent needs. For medical emergencies, dial 911.         Educational Inform